# Patient Record
Sex: FEMALE | Race: WHITE | NOT HISPANIC OR LATINO | Employment: FULL TIME | URBAN - METROPOLITAN AREA
[De-identification: names, ages, dates, MRNs, and addresses within clinical notes are randomized per-mention and may not be internally consistent; named-entity substitution may affect disease eponyms.]

---

## 2020-12-14 PROCEDURE — U0003 INFECTIOUS AGENT DETECTION BY NUCLEIC ACID (DNA OR RNA); SEVERE ACUTE RESPIRATORY SYNDROME CORONAVIRUS 2 (SARS-COV-2) (CORONAVIRUS DISEASE [COVID-19]), AMPLIFIED PROBE TECHNIQUE, MAKING USE OF HIGH THROUGHPUT TECHNOLOGIES AS DESCRIBED BY CMS-2020-01-R: HCPCS | Performed by: NURSE PRACTITIONER

## 2021-07-11 ENCOUNTER — HOSPITAL ENCOUNTER (EMERGENCY)
Facility: HOSPITAL | Age: 61
Discharge: HOME | End: 2021-07-11

## 2021-07-11 ENCOUNTER — APPOINTMENT (OUTPATIENT)
Dept: GENERAL RADIOLOGY | Facility: HOSPITAL | Age: 61
End: 2021-07-11

## 2021-07-11 VITALS
BODY MASS INDEX: 28.09 KG/M2 | HEART RATE: 88 BPM | SYSTOLIC BLOOD PRESSURE: 142 MMHG | RESPIRATION RATE: 16 BRPM | OXYGEN SATURATION: 96 % | TEMPERATURE: 98.2 F | HEIGHT: 60 IN | WEIGHT: 143.08 LBS | DIASTOLIC BLOOD PRESSURE: 78 MMHG

## 2021-07-11 DIAGNOSIS — S99.191A CLOSED FRACTURE OF BASE OF FIFTH METATARSAL BONE OF RIGHT FOOT AT METAPHYSEAL-DIAPHYSEAL JUNCTION, INITIAL ENCOUNTER: ICD-10-CM
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
## 2021-07-11 PROCEDURE — 73630 X-RAY EXAM OF FOOT: CPT

## 2021-07-11 PROCEDURE — 99283 EMERGENCY DEPT VISIT LOW MDM: CPT

## 2021-07-11 RX ORDER — HYDROCODONE BITARTRATE AND ACETAMINOPHEN 5; 325 MG/1; MG/1
1 TABLET ORAL EVERY 6 HOURS PRN
Qty: 12 TABLET | Refills: 0 | Status: SHIPPED | OUTPATIENT
Start: 2021-07-11 | End: 2022-04-13

## 2021-07-11 NOTE — ED PROVIDER NOTES
Subjective   Chief complaint: Fall      Context: Patient is a 61-year-old female who comes in complaining of right foot pain after she was at work and fell on a stepladder around 11 AM.  She denies any knee or hip pain.  She denies any prior injuries or surgeries to that foot.  She denies any ankle pain.    Duration:  Shortly prior to arrival  Timing: Waxes and wanes    Severity: Moderate    Associated symptoms: Worse with weightbearing          PCP:            Review of Systems   HENT: Negative.    Respiratory: Negative.    Cardiovascular: Negative.    Gastrointestinal: Negative.    Genitourinary: Negative.    Musculoskeletal: Positive for arthralgias.   Skin: Negative.    Allergic/Immunologic: Negative for immunocompromised state.   Neurological: Negative.    Hematological: Does not bruise/bleed easily.   Psychiatric/Behavioral: Negative for confusion.       Past Medical History:   Diagnosis Date   • COPD (chronic obstructive pulmonary disease) (CMS/[facility])        Allergies   Allergen Reactions   • Azithromycin Anaphylaxis   • Naproxen Anaphylaxis   • Penicillins Anaphylaxis and Swelling       Past Surgical History:   Procedure Laterality Date   • CHOLECYSTECTOMY     • TUBAL ABDOMINAL LIGATION         History reviewed. No pertinent family history.    Social History     Socioeconomic History   • Marital status:      Spouse name: Not on file   • Number of children: Not on file   • Years of education: Not on file   • Highest education level: Not on file   Tobacco Use   • Smoking status: Current Every Day Smoker     Packs/day: 1.00     Types: Cigarettes   • Smokeless tobacco: Never Used   Substance and Sexual Activity   • Alcohol use: Never           Objective   Physical Exam     Vital signs and traige nurse note reviewed.  Constitutional:  Awake, alert; well developed and well nourished.  No acute distress, the patient is examined in hospital gown.  HEENT:  Normocephalic, atraumatic;  with intact EOM; oropharynx  is pink and moist without edema or erythema.  Neck: Supple, full range of motion without pain;   Cardiovascular: Regular rate and rhythm,    Pulmonary: Respiratory effort regular, nonlabored;   Musculoskeletal: No pain over the bilateral malleoli tib-fib knee femur or hip.  There is some swelling and ecchymosis noted to the dorsal aspect of the right mid and proximal foot.  +3 DP pulse.  Distal resting sensorimotor intact  Neuro: Alert oriented x3, speech is clear and appropriate.      Procedures           ED Course        .edlabs  Medications - No data to display  XR Foot 3+ View Right    Result Date: 7/11/2021  Nondisplaced fracture of the proximal diaphysis of the fifth metatarsal.  Electronically Signed By-Fatemeh Morley MD On:7/11/2021 1:50 PM This report was finalized on [id number] by  Fatemeh Morley MD.    Prior to Admission medications    Medication Sig Start Date End Date Taking? Authorizing Provider   albuterol (2.5 MG/3ML) 0.083% nebulizer solution 3 mL, albuterol (5 MG/ML) 0.5% nebulizer solution 0.5 mL Inhale.    Emergency, Nurse Epic, RN   Albuterol Sulfate (PROAIR HFA IN) Inhale.    Emergency, Nurse Epic, RN   Fluticasone-Umeclidin-Vilant (Trelegy Ellipta) 200-62.5-25 MCG/INH aerosol powder  Inhale 1 puff.    Emergency, Nurse Tucker, RN   HYDROcodone-acetaminophen (NORCO) 5-325 MG per tablet Take 1 tablet by mouth Every 6 (Six) Hours As Needed for Moderate Pain . 7/11/21   Susan Bautista APRN   levoFLOXacin (Levaquin) 750 MG tablet Take 1 tablet by mouth Daily. 12/14/20 7/11/21  Sanjuanita Reyes APRN   methylPREDNISolone (MEDROL) 4 MG dose pack Take as directed on package instructions. 12/14/20 7/11/21  Sanjuanita Reyes, MINOO                                       MDM  Number of Diagnoses or Management Options  Closed fracture of base of fifth metatarsal bone of right foot at metaphyseal-diaphyseal junction, initial encounter  Fall, initial encounter  Diagnosis management comments:        Comorbidities:  has a past medical history of COPD (chronic obstructive pulmonary disease) (CMS/[facility]).  Differentials:   not all inclusive of differentials considered fracture strain contusion  Radiology interpretation:  X-rays reviewed by me and interpreted by radiologist,   XR Foot 3+ View Right    Result Date: 7/11/2021  Nondisplaced fracture of the proximal diaphysis of the fifth metatarsal.  Electronically Signed By-Fatemeh Morley MD On:7/11/2021 1:50 PM This report was finalized on [id number] by  Fatemeh Morley MD.      Appropriate PPE worn during exam.  X-ray obtained.  Placed in a postop shoe and given a cane.  Inspect report reviewed.  Will be discharged home with hydrocodone    i discussed findings with patient who voices understanding of discharge instructions, signs and symptoms requiring return to ED; discharged improved and in stable condition with follow up for re-evaluation.        Final diagnoses:   Fall, initial encounter   Closed fracture of base of fifth metatarsal bone of right foot at metaphyseal-diaphyseal junction, initial encounter       ED Disposition  ED Disposition     ED Disposition Condition Comment    Discharge Stable           occupational medicine  See attached form  Schedule an appointment as soon as possible for a visit       Panda Romero MD  [address] [place] [address] [place] [facility]  [phone]    Schedule an appointment as soon as possible for a visit            Medication List      New Prescriptions    HYDROcodone-acetaminophen 5-325 MG per tablet  Commonly known as: NORCO  Take 1 tablet by mouth Every 6 (Six) Hours As Needed for Moderate Pain .        Stop    levoFLOXacin 750 MG tablet  Commonly known as: Levaquin     methylPREDNISolone 4 MG dose pack  Commonly known as: MEDROL           Where to Get Your Medications      These medications were sent to [id number] - [place], [place] - [address]  -  [phone] [facility] [phone] FX  [address] [place] [address]    Phone: [phone]   · HYDROcodone-acetaminophen 5-325 MG per tablet          Susan Bautista, APRN  07/11/21 5910

## 2021-07-11 NOTE — DISCHARGE INSTRUCTIONS
Elevate.  Use ice 20 minutes at a time several times a day.  Follow-up with occupational medicine and you will need orthopedic evaluation.  Return for any new or worsening symptoms

## 2021-11-22 ENCOUNTER — HOSPITAL ENCOUNTER (EMERGENCY)
Facility: HOSPITAL | Age: 61
Discharge: HOME | End: 2021-11-22
Admitting: EMERGENCY MEDICINE

## 2021-11-22 ENCOUNTER — APPOINTMENT (OUTPATIENT)
Dept: CT IMAGING | Facility: HOSPITAL | Age: 61
End: 2021-11-22

## 2021-11-22 VITALS
HEART RATE: 96 BPM | RESPIRATION RATE: 20 BRPM | SYSTOLIC BLOOD PRESSURE: 129 MMHG | HEIGHT: 59 IN | WEIGHT: 140.21 LBS | TEMPERATURE: 97.1 F | BODY MASS INDEX: 28.27 KG/M2 | OXYGEN SATURATION: 93 % | DIASTOLIC BLOOD PRESSURE: 77 MMHG

## 2021-11-22 DIAGNOSIS — J44.1 COPD EXACERBATION (HCC): ICD-10-CM

## 2021-11-22 DIAGNOSIS — B34.9 VIRAL ILLNESS: Primary | ICD-10-CM

## 2021-11-22 DIAGNOSIS — R07.89 CHEST PRESSURE: ICD-10-CM

## 2021-11-22 DIAGNOSIS — J20.6 ACUTE BRONCHITIS DUE TO RHINOVIRUS: ICD-10-CM

## 2021-11-22 LAB
ALBUMIN SERPL-MCNC: 4 G/DL (ref 3.5–5.2)
ALBUMIN/GLOB SERPL: 1.3 G/DL
ALP SERPL-CCNC: 153 U/L (ref 39–117)
ALT SERPL W P-5'-P-CCNC: 11 U/L (ref 1–33)
ANION GAP SERPL CALCULATED.3IONS-SCNC: 12 MMOL/L (ref 5–15)
AST SERPL-CCNC: 17 U/L (ref 1–32)
B PARAPERT DNA SPEC QL NAA+PROBE: NOT DETECTED
B PERT DNA SPEC QL NAA+PROBE: NOT DETECTED
BASOPHILS # BLD AUTO: 0.1 10*3/MM3 (ref 0–0.2)
BASOPHILS NFR BLD AUTO: 1.4 % (ref 0–1.5)
BILIRUB SERPL-MCNC: 0.2 MG/DL (ref 0–1.2)
BUN SERPL-MCNC: 16 MG/DL (ref 8–23)
BUN/CREAT SERPL: 30.8 (ref 7–25)
C PNEUM DNA NPH QL NAA+NON-PROBE: NOT DETECTED
CALCIUM SPEC-SCNC: 9 MG/DL (ref 8.6–10.5)
CHLORIDE SERPL-SCNC: 99 MMOL/L (ref 98–107)
CO2 SERPL-SCNC: 26 MMOL/L (ref 22–29)
CREAT SERPL-MCNC: 0.52 MG/DL (ref 0.57–1)
D-LACTATE SERPL-SCNC: 0.6 MMOL/L (ref 0.5–2)
DEPRECATED RDW RBC AUTO: 41.6 FL (ref 37–54)
EOSINOPHIL # BLD AUTO: 0.2 10*3/MM3 (ref 0–0.4)
EOSINOPHIL NFR BLD AUTO: 2.4 % (ref 0.3–6.2)
ERYTHROCYTE [DISTWIDTH] IN BLOOD BY AUTOMATED COUNT: 14.2 % (ref 12.3–15.4)
FLUAV SUBTYP SPEC NAA+PROBE: NOT DETECTED
FLUBV RNA ISLT QL NAA+PROBE: NOT DETECTED
GFR SERPL CREATININE-BSD FRML MDRD: 119 ML/MIN/1.73
GLOBULIN UR ELPH-MCNC: 3.1 GM/DL
GLUCOSE SERPL-MCNC: 89 MG/DL (ref 65–99)
HADV DNA SPEC NAA+PROBE: NOT DETECTED
HCOV 229E RNA SPEC QL NAA+PROBE: DETECTED
HCOV HKU1 RNA SPEC QL NAA+PROBE: NOT DETECTED
HCOV NL63 RNA SPEC QL NAA+PROBE: NOT DETECTED
HCOV OC43 RNA SPEC QL NAA+PROBE: NOT DETECTED
HCT VFR BLD AUTO: 38.9 % (ref 34–46.6)
HGB BLD-MCNC: 13 G/DL (ref 12–15.9)
HMPV RNA NPH QL NAA+NON-PROBE: NOT DETECTED
HOLD SPECIMEN: NORMAL
HPIV1 RNA ISLT QL NAA+PROBE: NOT DETECTED
HPIV2 RNA SPEC QL NAA+PROBE: NOT DETECTED
HPIV3 RNA NPH QL NAA+PROBE: NOT DETECTED
HPIV4 P GENE NPH QL NAA+PROBE: NOT DETECTED
LYMPHOCYTES # BLD AUTO: 1.4 10*3/MM3 (ref 0.7–3.1)
LYMPHOCYTES NFR BLD AUTO: 21.2 % (ref 19.6–45.3)
M PNEUMO IGG SER IA-ACNC: NOT DETECTED
MCH RBC QN AUTO: 28 PG (ref 26.6–33)
MCHC RBC AUTO-ENTMCNC: 33.6 G/DL (ref 31.5–35.7)
MCV RBC AUTO: 83.6 FL (ref 79–97)
MONOCYTES # BLD AUTO: 0.5 10*3/MM3 (ref 0.1–0.9)
MONOCYTES NFR BLD AUTO: 7.8 % (ref 5–12)
NEUTROPHILS NFR BLD AUTO: 4.6 10*3/MM3 (ref 1.7–7)
NEUTROPHILS NFR BLD AUTO: 67.2 % (ref 42.7–76)
NRBC BLD AUTO-RTO: 0.1 /100 WBC (ref 0–0.2)
PLATELET # BLD AUTO: 415 10*3/MM3 (ref 140–450)
PMV BLD AUTO: 7.2 FL (ref 6–12)
POTASSIUM SERPL-SCNC: 4.5 MMOL/L (ref 3.5–5.2)
PROCALCITONIN SERPL-MCNC: 0.06 NG/ML (ref 0–0.25)
PROT SERPL-MCNC: 7.1 G/DL (ref 6–8.5)
QT INTERVAL: 371 MS
RBC # BLD AUTO: 4.65 10*6/MM3 (ref 3.77–5.28)
RHINOVIRUS RNA SPEC NAA+PROBE: DETECTED
RSV RNA NPH QL NAA+NON-PROBE: NOT DETECTED
SARS-COV-2 RNA NPH QL NAA+NON-PROBE: NOT DETECTED
SODIUM SERPL-SCNC: 137 MMOL/L (ref 136–145)
TROPONIN T SERPL-MCNC: <0.01 NG/ML (ref 0–0.03)
TROPONIN T SERPL-MCNC: <0.01 NG/ML (ref 0–0.03)
WBC NRBC COR # BLD: 6.8 10*3/MM3 (ref 3.4–10.8)
WHOLE BLOOD HOLD SPECIMEN: NORMAL

## 2021-11-22 PROCEDURE — 85025 COMPLETE CBC W/AUTO DIFF WBC: CPT | Performed by: PHYSICIAN ASSISTANT

## 2021-11-22 PROCEDURE — 71275 CT ANGIOGRAPHY CHEST: CPT

## 2021-11-22 PROCEDURE — 0 IOPAMIDOL PER 1 ML: Performed by: PHYSICIAN ASSISTANT

## 2021-11-22 PROCEDURE — 25010000002 METHYLPREDNISOLONE PER 125 MG: Performed by: PHYSICIAN ASSISTANT

## 2021-11-22 PROCEDURE — 83605 ASSAY OF LACTIC ACID: CPT

## 2021-11-22 PROCEDURE — 87040 BLOOD CULTURE FOR BACTERIA: CPT | Performed by: PHYSICIAN ASSISTANT

## 2021-11-22 PROCEDURE — 94640 AIRWAY INHALATION TREATMENT: CPT

## 2021-11-22 PROCEDURE — 96374 THER/PROPH/DIAG INJ IV PUSH: CPT

## 2021-11-22 PROCEDURE — 0202U NFCT DS 22 TRGT SARS-COV-2: CPT | Performed by: PHYSICIAN ASSISTANT

## 2021-11-22 PROCEDURE — 99284 EMERGENCY DEPT VISIT MOD MDM: CPT

## 2021-11-22 PROCEDURE — 80053 COMPREHEN METABOLIC PANEL: CPT | Performed by: PHYSICIAN ASSISTANT

## 2021-11-22 PROCEDURE — 93005 ELECTROCARDIOGRAM TRACING: CPT | Performed by: PHYSICIAN ASSISTANT

## 2021-11-22 PROCEDURE — 84145 PROCALCITONIN (PCT): CPT | Performed by: PHYSICIAN ASSISTANT

## 2021-11-22 PROCEDURE — 84484 ASSAY OF TROPONIN QUANT: CPT | Performed by: PHYSICIAN ASSISTANT

## 2021-11-22 PROCEDURE — 94799 UNLISTED PULMONARY SVC/PX: CPT

## 2021-11-22 RX ORDER — METHYLPREDNISOLONE 4 MG/1
TABLET ORAL
Qty: 21 EACH | Refills: 0 | Status: SHIPPED | OUTPATIENT
Start: 2021-11-22 | End: 2022-04-13

## 2021-11-22 RX ORDER — METHYLPREDNISOLONE SODIUM SUCCINATE 125 MG/2ML
125 INJECTION, POWDER, LYOPHILIZED, FOR SOLUTION INTRAMUSCULAR; INTRAVENOUS ONCE
Status: COMPLETED | OUTPATIENT
Start: 2021-11-22 | End: 2021-11-22

## 2021-11-22 RX ORDER — SODIUM CHLORIDE 0.9 % (FLUSH) 0.9 %
10 SYRINGE (ML) INJECTION AS NEEDED
Status: DISCONTINUED | OUTPATIENT
Start: 2021-11-22 | End: 2021-11-22 | Stop reason: HOSPADM

## 2021-11-22 RX ORDER — ALBUTEROL SULFATE 90 UG/1
2 AEROSOL, METERED RESPIRATORY (INHALATION) ONCE
Status: COMPLETED | OUTPATIENT
Start: 2021-11-22 | End: 2021-11-22

## 2021-11-22 RX ORDER — ALBUTEROL SULFATE 1.25 MG/3ML
1 SOLUTION RESPIRATORY (INHALATION) EVERY 6 HOURS PRN
Qty: 3 ML | Refills: 0 | OUTPATIENT
Start: 2021-11-22 | End: 2022-11-14

## 2021-11-22 RX ADMIN — IOPAMIDOL 100 ML: 755 INJECTION, SOLUTION INTRAVENOUS at 16:40

## 2021-11-22 RX ADMIN — ALBUTEROL SULFATE 2 PUFF: 108 INHALANT RESPIRATORY (INHALATION) at 15:50

## 2021-11-22 RX ADMIN — METHYLPREDNISOLONE SODIUM SUCCINATE 125 MG: 125 INJECTION, POWDER, FOR SOLUTION INTRAMUSCULAR; INTRAVENOUS at 15:14

## 2021-11-22 NOTE — DISCHARGE INSTRUCTIONS
Please use Tylenol as needed for fever and pain control.  Please take Medrol Dosepak as prescribed even if feeling better to completion.  Please follow-up with your primary care provider in 1 week's time.  If you do not have a primary care provider please call the patient connect number above to set this up.  Can use over-the-counter cough suppressants and Mucinex as needed for further symptom management to help with congestion.  Please take albuterol as needed for shortness of breath and wheezing as needed.  Please come back to the ER if you have severe chest pain or shortness of breath as you will need reevaluation at that time.

## 2021-11-22 NOTE — ED NOTES
Ambulated pt around room. Steady on feet, no dizziness. O2 saturation stayed at 91% - 92%     Sanjuanita Castillo RN  11/22/21 9083

## 2021-11-22 NOTE — ED NOTES
"Pt c/o cough as well. Pt smokes, no oxygen at home. Has COPD. States recovering from the flu. Pt states home covid test was negative. Coughs up yellow sputum. Daughter states \"streaks of blood\" in sputum. States she gets pneumonia approx 2 year.      Sanjuanita Castillo RN  11/22/21 9259    "

## 2021-11-22 NOTE — ED PROVIDER NOTES
Subjective     Patient is a 61-year-old female comes in complaining of cough and congestion for the past 9 days.  Patient states that she took an antibiotic and when she is unsure of the name in which she had leftover from a prior pneumonia and states that her symptoms improved for a few days but got worse over the past 2 days.  Patient states she has a history of COPD and takes nebulizers and inhalers at home that have somewhat helped her symptoms.  Patient states that she had some chest pressure in the middle of her chest that was nonradiating that was mild about a 2 out of 10 but resolved on its own and denies any chest pain or pressure at this time.  Patient states that she has a productive cough with yellow sputum and about 4 days ago had streaks of blood in the sputum but states that this has improved.  Patient reports subjective fever and chills that come and go throughout her length of symptoms.  Patient states she is not on supplemental oxygen at home.  Patient states she had a home Covid test that was 9 days ago but did not get tested anywhere else and is not vaccinated for COVID-19.  Patient denies any prior history of blood clots or any heart issues in the past.        Review of Systems   Constitutional: Positive for chills. Negative for diaphoresis, fatigue and fever.   HENT: Positive for congestion, rhinorrhea and sinus pressure. Negative for nosebleeds, postnasal drip, sinus pain, sneezing, sore throat, tinnitus and trouble swallowing.    Eyes: Negative for photophobia, discharge and visual disturbance.   Respiratory: Positive for cough, chest tightness and shortness of breath. Negative for wheezing.    Cardiovascular: Positive for chest pain (pressure, resolved). Negative for palpitations and leg swelling.   Gastrointestinal: Negative for abdominal pain, blood in stool, diarrhea, nausea and vomiting.   Genitourinary: Negative for dysuria, flank pain, frequency and urgency.   Musculoskeletal: Negative  for arthralgias and myalgias.   Skin: Negative for rash.   Neurological: Negative for dizziness, syncope, light-headedness and headaches.   Psychiatric/Behavioral: Negative for confusion.       Past Medical History:   Diagnosis Date   • COPD (chronic obstructive pulmonary disease) (HCC)        Allergies   Allergen Reactions   • Azithromycin Anaphylaxis   • Naproxen Anaphylaxis   • Penicillins Anaphylaxis and Swelling       Past Surgical History:   Procedure Laterality Date   • CHOLECYSTECTOMY     • TUBAL ABDOMINAL LIGATION         History reviewed. No pertinent family history.    Social History     Socioeconomic History   • Marital status:    Tobacco Use   • Smoking status: Current Every Day Smoker     Packs/day: 1.00     Types: Cigarettes   • Smokeless tobacco: Never Used   Substance and Sexual Activity   • Alcohol use: Never           Objective   Physical Exam  Vitals and nursing note reviewed.   Constitutional:       General: She is not in acute distress.     Appearance: She is well-developed. She is not diaphoretic.   HENT:      Head: Normocephalic and atraumatic.      Nose: Nose normal.      Mouth/Throat:      Pharynx: No oropharyngeal exudate.   Eyes:      Extraocular Movements: Extraocular movements intact.      Conjunctiva/sclera: Conjunctivae normal.      Pupils: Pupils are equal, round, and reactive to light.   Cardiovascular:      Rate and Rhythm: Normal rate and regular rhythm.      Heart sounds: Normal heart sounds.      Comments: S1, S2 audible.  Pulmonary:      Effort: Pulmonary effort is normal. No respiratory distress.      Breath sounds: Normal breath sounds. No wheezing.      Comments: On room air.  Abdominal:      General: Bowel sounds are normal. There is no distension.      Palpations: Abdomen is soft.      Tenderness: There is no abdominal tenderness.   Musculoskeletal:         General: No tenderness or deformity. Normal range of motion.      Cervical back: Normal range of motion.  "  Skin:     General: Skin is warm.      Capillary Refill: Capillary refill takes less than 2 seconds.      Findings: No erythema or rash.   Neurological:      Mental Status: She is alert and oriented to person, place, and time.      Cranial Nerves: No cranial nerve deficit.   Psychiatric:         Mood and Affect: Mood normal.         Behavior: Behavior normal.         Procedures           ED Course      /67   Pulse 93   Temp 97.1 °F (36.2 °C) (Temporal)   Resp 16   Ht 149.9 cm (59\")   Wt 63.6 kg (140 lb 3.4 oz)   SpO2 90%   BMI 28.32 kg/m²   Labs Reviewed   RESPIRATORY PANEL PCR W/ COVID-19 (SARS-COV-2) NICK/ANISH/SYLVIA/PAD/COR/MAD/MIGUEL IN-HOUSE, NP SWAB IN UTM/VTP, 3-4 HR TAT - Abnormal; Notable for the following components:       Result Value    Coronavirus 229E Detected (*)     Human Rhinovirus/Enterovirus Detected (*)     All other components within normal limits    Narrative:     In the setting of a positive respiratory panel with a viral infection PLUS a negative procalcitonin without other underlying concern for bacterial infection, consider observing off antibiotics or discontinuation of antibiotics and continue supportive care. If the respiratory panel is positive for atypical bacterial infection (Bordetella pertussis, Chlamydophila pneumoniae, or Mycoplasma pneumoniae), consider antibiotic de-escalation to target atypical bacterial infection.   COMPREHENSIVE METABOLIC PANEL - Abnormal; Notable for the following components:    Creatinine 0.52 (*)     Alkaline Phosphatase 153 (*)     BUN/Creatinine Ratio 30.8 (*)     All other components within normal limits    Narrative:     GFR Normal >60  Chronic Kidney Disease <60  Kidney Failure <15     TROPONIN (IN-HOUSE) - Normal    Narrative:     Troponin T Reference Range:  <= 0.03 ng/mL-   Negative for AMI  >0.03 ng/mL-     Abnormal for myocardial necrosis.  Clinicians would have to utilize clinical acumen, EKG, Troponin and serial changes to determine if it " "is an Acute Myocardial Infarction or myocardial injury due to an underlying chronic condition.       Results may be falsely decreased if patient taking Biotin.     CBC WITH AUTO DIFFERENTIAL - Normal   PROCALCITONIN - Normal    Narrative:     As a Marker for Sepsis (Non-Neonates):     1. <0.5 ng/mL represents a low risk of severe sepsis and/or septic shock.  2. >2 ng/mL represents a high risk of severe sepsis and/or septic shock.    As a Marker for Lower Respiratory Tract Infections that require antibiotic therapy:  PCT on Admission     Antibiotic Therapy             6-12 Hrs later  >0.5                          Strongly Recommended            >0.25 - <0.5             Recommended  0.1 - 0.25                  Discouraged                       Remeasure/reassess PCT  <0.1                         Strongly Discouraged         Remeasure/reassess PCT      As 28 day mortality risk marker: \"Change in Procalcitonin Result\" (>80% or <=80%) if Day 0 (or Day 1) and Day 4 values are available. Refer to http://[email or web address] [email or web address]-[email or web address]/    Change in PCT <=80 %   A decrease of PCT levels below or equal to 80% defines a positive change in PCT test result representing a higher risk for 28-day all-cause mortality of patients diagnosed with severe sepsis or septic shock.    Change in PCT >80 %   A decrease of PCT levels of more than 80% defines a negative change in PCT result representing a lower risk for 28-day all-cause mortality of patients diagnosed with severe sepsis or septic shock.               TROPONIN (IN-HOUSE) - Normal    Narrative:     Troponin T Reference Range:  <= 0.03 ng/mL-   Negative for AMI  >0.03 ng/mL-     Abnormal for myocardial necrosis.  Clinicians would have to utilize clinical acumen, EKG, Troponin and serial changes to determine if it is an Acute Myocardial Infarction or myocardial injury due to an underlying chronic condition.       Results may be falsely decreased if patient taking Biotin.     POC " LACTATE - Normal   BLOOD CULTURE   BLOOD CULTURE   POC LACTATE   CBC AND DIFFERENTIAL    Narrative:     The following orders were created for panel order CBC & Differential.  Procedure                               Abnormality         Status                     ---------                               -----------         ------                     CBC Auto Differential[[id number]]        Normal              Final result                 Please view results for these tests on the individual orders.   EXTRA TUBES    Narrative:     The following orders were created for panel order Extra Tubes.  Procedure                               Abnormality         Status                     ---------                               -----------         ------                     Gold Top - SST[[id number]]                                   Final result               Light Blue Top[[id number]]                                   Final result                 Please view results for these tests on the individual orders.   GOLD TOP - SST   LIGHT BLUE TOP     CT Chest Pulmonary Embolism    Result Date: 11/22/2021  1.No evidence for pulmonary embolism. 2.Severe changes of emphysema are noted. 3.There are patchy opacities throughout the perihilar aspect of the right lung. The findings suggest developing multifocal right lung pneumonia. Changes of underlying malignancy are felt less likely. Recommend correlation for signs or symptoms of acute infection. Also recommend follow-up to ensure complete resolution.  Electronically Signed By-Leonel Khan MD On:11/22/2021 4:59 PM This report was finalized on [id number] by  Leonel Khan MD.                                         MDM  Number of Diagnoses or Management Options     Amount and/or Complexity of Data Reviewed  Clinical lab tests: reviewed  Tests in the radiology section of CPT®: reviewed  Tests in the medicine section of CPT®: reviewed    Risk of Complications, Morbidity, and/or  "Mortality  Presenting problems: low  Diagnostic procedures: low  Management options: low    Patient Progress  Patient progress: stable       Chart review: Heart score of 3.    EKG: EKG reviewed by myself and interpreted by Dr. Gerald Naidu shows sinus rhythm 88 bpm, no ST elevation apparent, no previous to compare.    Imaging:    CT Chest Pulmonary Embolism   Final Result   1.No evidence for pulmonary embolism.   2.Severe changes of emphysema are noted.   3.There are patchy opacities throughout the perihilar aspect of the   right lung. The findings suggest developing multifocal right lung   pneumonia. Changes of underlying malignancy are felt less likely.   Recommend correlation for signs or symptoms of acute infection. Also   recommend follow-up to ensure complete resolution.       Electronically Signed By-Leonel Khan MD On:11/22/2021 4:59 PM   This report was finalized on [id number] by  Leonel Khan MD.          Labs: Patient positive for coronavirus but negative for COVID-19.  Patient was positive for human rhinovirus as well.  Blood cultures x2 pending.  Initial lactic normal.  Initial troponin negative, procalcitonin normal, CBC and CMP largely unremarkable for acute findings.    Vitals:  /67   Pulse 93   Temp 97.1 °F (36.2 °C) (Temporal)   Resp 16   Ht 149.9 cm (59\")   Wt 63.6 kg (140 lb 3.4 oz)   SpO2 90%   BMI 28.32 kg/m²     Medications given:    Medications   sodium chloride 0.9 % flush 10 mL (has no administration in time range)   albuterol sulfate HFA (PROVENTIL HFA;VENTOLIN HFA;PROAIR HFA) inhaler 2 puff (2 puffs Inhalation Given 11/22/21 1550)   methylPREDNISolone sodium succinate (SOLU-Medrol) injection 125 mg (125 mg Intravenous Given 11/22/21 1514)   iopamidol (ISOVUE-370) 76 % injection 100 mL (100 mL Intravenous Given 11/22/21 1640)       Procedures:    MDM: Patient is a 61-year-old female comes in complaining of cough and congestion and has a history of COPD.  Patient was not " hypoxic and heart rate was initially 105 and had Covid-like symptoms.  Patient had 90% SPO2 are above and likely hangs out low due to her history of COPD.  Patient not requiring supplemental oxygen at this time and is not on supplemental oxygen at home.  Patient was nonwheezy on exam but given patient's symptoms patient was given Solu-Medrol as well as albuterol treatment and patient reported some improvement of symptoms.  Initial lactic normal blood cultures x2 pending.  CBC and CMP largely unremarkable.  Respiratory viral panel was positive for coronavirus but negative for COVID-19.  This was also positive for human rhinovirus as well.  CT PE protocol was obtained due to patient's tachycardia and shortness of breath as well as chest pressure however this was negative for pulmonary embolism.  Patient has findings consistent with coronavirus infection.  Patient does not have any indication for further antibiotics at this time.  Patient will be discharged on Medrol Dosepak as well as a refill of her inhalers.  Patient is given strict return precautions and voiced understanding.  Patient was also offered observation admission for further cardiac work-up by patient is refusing this at this time and states that she feels like her symptoms are consistent with COPD exacerbation that she has had in the past. See full discharge instructions for further details.  Results and plan discussed with patient and is agreeable with plan.    Final diagnoses:   Viral illness   Acute bronchitis due to Rhinovirus   Chest pressure   COPD exacerbation (HCC)       ED Disposition  ED Disposition     ED Disposition Condition Comment    Discharge Stable           [facility] EMERGENCY DEPARTMENT  [address] [address] [phone]  Go in 1 day  As needed, If symptoms worsen    PATIENT [facility] [address]  [phone]  Call in 1 day           Medication List      New Prescriptions     methylPREDNISolone 4 MG dose pack  Commonly known as: MEDROL  Take as directed on package instructions.        Changed    * albuterol (2.5 MG/3ML) 0.083% nebulizer solution 3 mL, albuterol (5 MG/ML) 0.5% nebulizer solution 0.5 mL  What changed: Another medication with the same name was added. Make sure you understand how and when to take each.     * PROAIR HFA IN  What changed: Another medication with the same name was added. Make sure you understand how and when to take each.     * albuterol 1.25 MG/3ML nebulizer solution  Commonly known as: ACCUNEB  Take 3 mL by nebulization Every 6 (Six) Hours As Needed for Wheezing or Shortness of Air.  What changed: You were already taking a medication with the same name, and this prescription was added. Make sure you understand how and when to take each.     Trelegy Ellipta 200-62.5-25 MCG/INH inhaler  Generic drug: Fluticasone-Umeclidin-Vilant  Inhale 1 puff Daily.  What changed: when to take this         * This list has 3 medication(s) that are the same as other medications prescribed for you. Read the directions carefully, and ask your doctor or other care provider to review them with you.               Where to Get Your Medications      These medications were sent to [id number] - [place] - [address] [facility] [address] - [phone] PH - [phone] FX  [address] [place] [address]    Phone: [phone]   · albuterol 1.25 MG/3ML nebulizer solution  · methylPREDNISolone 4 MG dose pack  · Trelegy Ellipta 200-62.5-25 MCG/INH inhaler          Sebastian Salcedo PA  11/22/21 4110

## 2021-11-27 LAB
BACTERIA SPEC AEROBE CULT: NORMAL
BACTERIA SPEC AEROBE CULT: NORMAL

## 2022-10-04 ENCOUNTER — HOSPITAL ENCOUNTER (EMERGENCY)
Facility: HOSPITAL | Age: 62
Discharge: HOME | End: 2022-10-04
Attending: EMERGENCY MEDICINE | Admitting: EMERGENCY MEDICINE

## 2022-10-04 VITALS
TEMPERATURE: 98.8 F | DIASTOLIC BLOOD PRESSURE: 67 MMHG | SYSTOLIC BLOOD PRESSURE: 127 MMHG | BODY MASS INDEX: 27.66 KG/M2 | HEIGHT: 60 IN | OXYGEN SATURATION: 98 % | RESPIRATION RATE: 18 BRPM | WEIGHT: 140.87 LBS | HEART RATE: 97 BPM

## 2022-10-04 DIAGNOSIS — S61.412A LACERATION OF LEFT PALM, INITIAL ENCOUNTER: Primary | ICD-10-CM

## 2022-10-04 PROCEDURE — 99283 EMERGENCY DEPT VISIT LOW MDM: CPT

## 2022-10-04 PROCEDURE — 90715 TDAP VACCINE 7 YRS/> IM: CPT | Performed by: EMERGENCY MEDICINE

## 2022-10-04 PROCEDURE — 25010000002 TETANUS-DIPHTH-ACELL PERTUSSIS 5-2.5-18.5 LF-MCG/0.5 SUSPENSION PREFILLED SYRINGE: Performed by: EMERGENCY MEDICINE

## 2022-10-04 PROCEDURE — 90471 IMMUNIZATION ADMIN: CPT | Performed by: EMERGENCY MEDICINE

## 2022-10-04 RX ADMIN — TETANUS TOXOID, REDUCED DIPHTHERIA TOXOID AND ACELLULAR PERTUSSIS VACCINE, ADSORBED 0.5 ML: 5; 2.5; 8; 8; 2.5 SUSPENSION INTRAMUSCULAR at 17:39

## 2022-10-04 NOTE — ED NOTES
Patient was opening a box with a  knife and it slipped catching the palm of her left hand. Laceration noted

## 2022-10-04 NOTE — ED PROVIDER NOTES
Subjective   History of Present Illness  62-year-old female with history of COPD presents for laceration base of left hand.  Happened 2 to 3 hours prior to arrival.  Cut with a .  Last Tdap more than 5 years ago.  Patient cleaned with alcohol prior to arrival.  No numbness.  No weakness.  Bleeding controlled earlier.  Mild pain with movement.  Pain improves when she does not move it.  Has not taken anything for pain.  Review of Systems   Skin: Positive for wound.   Neurological: Negative for weakness and numbness.   All other systems reviewed and are negative.      Past Medical History:   Diagnosis Date   • COPD (chronic obstructive pulmonary disease) (HCC)        Allergies   Allergen Reactions   • Azithromycin Anaphylaxis   • Naproxen Anaphylaxis   • Penicillin G Swelling   • Penicillins Anaphylaxis and Swelling   • Codeine Nausea And Vomiting       Past Surgical History:   Procedure Laterality Date   • CHOLECYSTECTOMY     • TUBAL ABDOMINAL LIGATION         No family history on file.    Social History     Socioeconomic History   • Marital status:    Tobacco Use   • Smoking status: Current Every Day Smoker     Packs/day: 1.00     Types: Cigarettes   • Smokeless tobacco: Never Used   Substance and Sexual Activity   • Alcohol use: Never           Objective   Physical Exam  Constitutional:  No acute distress.  Head:  Atraumatic.  Normocephalic.   Eyes:  No scleral icterus. Normal conjunctiva  ENT:  Moist mucosa.  No nasal discharge present.  Cardiovascular:  Well perfused.  Equal pulses.  Regular rhythm and normal rate.  Normal capillary refill.    Pulmonary/Chest:  No respiratory distress.  Airway patent.  No tachypnea.  No accessory muscle usage.    Extremities: 2+ radial pulse left upper extremity.  Cap refill less than 3 seconds on each finger.  Strength intact in wrist and all fingers of left hand.  3 cm laceration that does not extend into the subcu on palmar surface at base of left hand.   "Sensation intact to light touch in each finger and over palm.  Skin:  Warm, dry  Neurological:  Alert, awake, and appropriate.  Normal speech.      Procedures           ED Course      /67 (BP Location: Left arm, Patient Position: Sitting)   Pulse 97   Temp 98.8 °F (37.1 °C) (Oral)   Resp 18   Ht 151.1 cm (59.5\")   Wt 63.9 kg (140 lb 14 oz)   SpO2 98%   BMI 27.98 kg/m²   Labs Reviewed - No data to display  Medications   Tetanus-Diphth-Acell Pertussis (BOOSTRIX) injection 0.5 mL (has no administration in time range)     No radiology results for the last day                                       MDM  Patient neurovascularly intact.  Tdap updated.  Discussed risks and benefits of closing laceration and patient states that she does not want to close wound at this time.  Will irrigate.  Wound care discussed.  Will DC home  Final diagnoses:   Laceration of left palm, initial encounter       ED Disposition  ED Disposition     ED Disposition   Discharge    Condition   Stable    Comment   --             PATIENT CONNECTION - [facility] [address] [phone]  In 1 week  For wound re-check         Medication List      No changes were made to your prescriptions during this visit.          Jared Garcia MD  10/04/22 1730    "

## 2022-11-14 ENCOUNTER — APPOINTMENT (OUTPATIENT)
Dept: GENERAL RADIOLOGY | Facility: HOSPITAL | Age: 62
End: 2022-11-14

## 2022-11-14 ENCOUNTER — HOSPITAL ENCOUNTER (EMERGENCY)
Facility: HOSPITAL | Age: 62
Discharge: HOME | End: 2022-11-14
Attending: EMERGENCY MEDICINE | Admitting: EMERGENCY MEDICINE

## 2022-11-14 VITALS
BODY MASS INDEX: 28.89 KG/M2 | DIASTOLIC BLOOD PRESSURE: 78 MMHG | HEIGHT: 59 IN | SYSTOLIC BLOOD PRESSURE: 138 MMHG | WEIGHT: 143.3 LBS | TEMPERATURE: 98.4 F | RESPIRATION RATE: 12 BRPM | HEART RATE: 85 BPM | OXYGEN SATURATION: 99 %

## 2022-11-14 DIAGNOSIS — J44.1 COPD WITH ACUTE EXACERBATION: ICD-10-CM

## 2022-11-14 DIAGNOSIS — T78.2XXA ANAPHYLAXIS, INITIAL ENCOUNTER: Primary | ICD-10-CM

## 2022-11-14 LAB
ALBUMIN SERPL-MCNC: 4.3 G/DL (ref 3.5–5.2)
ALBUMIN/GLOB SERPL: 1.5 G/DL
ALP SERPL-CCNC: 131 U/L (ref 39–117)
ALT SERPL W P-5'-P-CCNC: 9 U/L (ref 1–33)
ANION GAP SERPL CALCULATED.3IONS-SCNC: 14 MMOL/L (ref 5–15)
AST SERPL-CCNC: 26 U/L (ref 1–32)
BASOPHILS # BLD AUTO: 0.1 10*3/MM3 (ref 0–0.2)
BASOPHILS NFR BLD AUTO: 0.4 % (ref 0–1.5)
BILIRUB SERPL-MCNC: 0.2 MG/DL (ref 0–1.2)
BUN SERPL-MCNC: 11 MG/DL (ref 8–23)
BUN/CREAT SERPL: 19.6 (ref 7–25)
CALCIUM SPEC-SCNC: 8.9 MG/DL (ref 8.6–10.5)
CHLORIDE SERPL-SCNC: 103 MMOL/L (ref 98–107)
CO2 SERPL-SCNC: 24 MMOL/L (ref 22–29)
CREAT SERPL-MCNC: 0.56 MG/DL (ref 0.57–1)
D-LACTATE SERPL-SCNC: 0.7 MMOL/L (ref 0.5–2)
DEPRECATED RDW RBC AUTO: 43.8 FL (ref 37–54)
EGFRCR SERPLBLD CKD-EPI 2021: 102.7 ML/MIN/1.73
EOSINOPHIL # BLD AUTO: 0.2 10*3/MM3 (ref 0–0.4)
EOSINOPHIL NFR BLD AUTO: 1.8 % (ref 0.3–6.2)
ERYTHROCYTE [DISTWIDTH] IN BLOOD BY AUTOMATED COUNT: 14 % (ref 12.3–15.4)
GLOBULIN UR ELPH-MCNC: 2.9 GM/DL
GLUCOSE SERPL-MCNC: 114 MG/DL (ref 65–99)
HCT VFR BLD AUTO: 40.9 % (ref 34–46.6)
HGB BLD-MCNC: 13.9 G/DL (ref 12–15.9)
HOLD SPECIMEN: NORMAL
LYMPHOCYTES # BLD AUTO: 1.7 10*3/MM3 (ref 0.7–3.1)
LYMPHOCYTES NFR BLD AUTO: 14.7 % (ref 19.6–45.3)
MCH RBC QN AUTO: 28.8 PG (ref 26.6–33)
MCHC RBC AUTO-ENTMCNC: 34 G/DL (ref 31.5–35.7)
MCV RBC AUTO: 84.5 FL (ref 79–97)
MONOCYTES # BLD AUTO: 0.6 10*3/MM3 (ref 0.1–0.9)
MONOCYTES NFR BLD AUTO: 5.1 % (ref 5–12)
NEUTROPHILS NFR BLD AUTO: 78 % (ref 42.7–76)
NEUTROPHILS NFR BLD AUTO: 9 10*3/MM3 (ref 1.7–7)
NRBC BLD AUTO-RTO: 0.2 /100 WBC (ref 0–0.2)
NT-PROBNP SERPL-MCNC: 64.7 PG/ML (ref 0–900)
PLATELET # BLD AUTO: 407 10*3/MM3 (ref 140–450)
PMV BLD AUTO: 7.9 FL (ref 6–12)
POTASSIUM SERPL-SCNC: 4.3 MMOL/L (ref 3.5–5.2)
PROCALCITONIN SERPL-MCNC: 0.03 NG/ML (ref 0–0.25)
PROT SERPL-MCNC: 7.2 G/DL (ref 6–8.5)
RBC # BLD AUTO: 4.84 10*6/MM3 (ref 3.77–5.28)
SODIUM SERPL-SCNC: 141 MMOL/L (ref 136–145)
TROPONIN T SERPL-MCNC: <0.01 NG/ML (ref 0–0.03)
WBC NRBC COR # BLD: 11.5 10*3/MM3 (ref 3.4–10.8)
WHOLE BLOOD HOLD COAG: NORMAL

## 2022-11-14 PROCEDURE — 83880 ASSAY OF NATRIURETIC PEPTIDE: CPT | Performed by: NURSE PRACTITIONER

## 2022-11-14 PROCEDURE — 36415 COLL VENOUS BLD VENIPUNCTURE: CPT

## 2022-11-14 PROCEDURE — 25010000002 DIPHENHYDRAMINE PER 50 MG

## 2022-11-14 PROCEDURE — 84145 PROCALCITONIN (PCT): CPT | Performed by: NURSE PRACTITIONER

## 2022-11-14 PROCEDURE — 96375 TX/PRO/DX INJ NEW DRUG ADDON: CPT

## 2022-11-14 PROCEDURE — 71045 X-RAY EXAM CHEST 1 VIEW: CPT

## 2022-11-14 PROCEDURE — 96372 THER/PROPH/DIAG INJ SC/IM: CPT

## 2022-11-14 PROCEDURE — 80053 COMPREHEN METABOLIC PANEL: CPT | Performed by: NURSE PRACTITIONER

## 2022-11-14 PROCEDURE — 84484 ASSAY OF TROPONIN QUANT: CPT | Performed by: NURSE PRACTITIONER

## 2022-11-14 PROCEDURE — 25010000002 EPINEPHRINE PER 0.1 MG

## 2022-11-14 PROCEDURE — 85025 COMPLETE CBC W/AUTO DIFF WBC: CPT | Performed by: NURSE PRACTITIONER

## 2022-11-14 PROCEDURE — 99284 EMERGENCY DEPT VISIT MOD MDM: CPT

## 2022-11-14 PROCEDURE — 93005 ELECTROCARDIOGRAM TRACING: CPT | Performed by: EMERGENCY MEDICINE

## 2022-11-14 PROCEDURE — 25010000002 EPINEPHRINE PER 0.1 MG: Performed by: NURSE PRACTITIONER

## 2022-11-14 PROCEDURE — 93005 ELECTROCARDIOGRAM TRACING: CPT | Performed by: NURSE PRACTITIONER

## 2022-11-14 PROCEDURE — 25010000002 METHYLPREDNISOLONE PER 125 MG

## 2022-11-14 PROCEDURE — 96374 THER/PROPH/DIAG INJ IV PUSH: CPT

## 2022-11-14 PROCEDURE — 87040 BLOOD CULTURE FOR BACTERIA: CPT | Performed by: NURSE PRACTITIONER

## 2022-11-14 PROCEDURE — 83605 ASSAY OF LACTIC ACID: CPT

## 2022-11-14 RX ORDER — DIPHENHYDRAMINE HYDROCHLORIDE 50 MG/ML
INJECTION INTRAMUSCULAR; INTRAVENOUS
Status: COMPLETED
Start: 2022-11-14 | End: 2022-11-14

## 2022-11-14 RX ORDER — METHYLPREDNISOLONE SODIUM SUCCINATE 125 MG/2ML
125 INJECTION, POWDER, LYOPHILIZED, FOR SOLUTION INTRAMUSCULAR; INTRAVENOUS ONCE
Status: COMPLETED | OUTPATIENT
Start: 2022-11-14 | End: 2022-11-14

## 2022-11-14 RX ORDER — METHYLPREDNISOLONE SODIUM SUCCINATE 125 MG/2ML
INJECTION, POWDER, LYOPHILIZED, FOR SOLUTION INTRAMUSCULAR; INTRAVENOUS
Status: COMPLETED
Start: 2022-11-14 | End: 2022-11-14

## 2022-11-14 RX ORDER — EPINEPHRINE 1 MG/ML
0.3 INJECTION, SOLUTION, CONCENTRATE INTRAVENOUS ONCE
Status: COMPLETED | OUTPATIENT
Start: 2022-11-14 | End: 2022-11-14

## 2022-11-14 RX ORDER — EPINEPHRINE 0.3 MG/.3ML
0.3 INJECTION SUBCUTANEOUS ONCE
Qty: 1 EACH | Refills: 0 | Status: SHIPPED | OUTPATIENT
Start: 2022-11-14 | End: 2022-11-14

## 2022-11-14 RX ORDER — ALBUTEROL SULFATE 90 UG/1
2 AEROSOL, METERED RESPIRATORY (INHALATION) EVERY 4 HOURS PRN
Qty: 1 G | Refills: 0 | Status: SHIPPED | OUTPATIENT
Start: 2022-11-14 | End: 2023-01-04 | Stop reason: SDUPTHER

## 2022-11-14 RX ORDER — IPRATROPIUM BROMIDE AND ALBUTEROL SULFATE 2.5; .5 MG/3ML; MG/3ML
3 SOLUTION RESPIRATORY (INHALATION) EVERY 4 HOURS PRN
Qty: 90 ML | Refills: 0 | Status: ON HOLD | OUTPATIENT
Start: 2022-11-14 | End: 2022-12-31 | Stop reason: SDUPTHER

## 2022-11-14 RX ORDER — EPINEPHRINE 1 MG/ML
INJECTION, SOLUTION, CONCENTRATE INTRAVENOUS
Status: COMPLETED
Start: 2022-11-14 | End: 2022-11-14

## 2022-11-14 RX ORDER — FAMOTIDINE 10 MG/ML
20 INJECTION, SOLUTION INTRAVENOUS ONCE
Status: COMPLETED | OUTPATIENT
Start: 2022-11-14 | End: 2022-11-14

## 2022-11-14 RX ORDER — METHYLPREDNISOLONE 4 MG/1
TABLET ORAL
Qty: 21 TABLET | Refills: 0 | OUTPATIENT
Start: 2022-11-14 | End: 2022-12-23

## 2022-11-14 RX ORDER — DIPHENHYDRAMINE HYDROCHLORIDE 50 MG/ML
25 INJECTION INTRAMUSCULAR; INTRAVENOUS ONCE
Status: COMPLETED | OUTPATIENT
Start: 2022-11-14 | End: 2022-11-14

## 2022-11-14 RX ADMIN — EPINEPHRINE 0.3 MG: 1 INJECTION, SOLUTION, CONCENTRATE INTRAVENOUS at 08:58

## 2022-11-14 RX ADMIN — EPINEPHRINE 0.3 MG: 1 INJECTION, SOLUTION, CONCENTRATE INTRAVENOUS at 08:36

## 2022-11-14 RX ADMIN — METHYLPREDNISOLONE SODIUM SUCCINATE 125 MG: 125 INJECTION, POWDER, LYOPHILIZED, FOR SOLUTION INTRAMUSCULAR; INTRAVENOUS at 08:40

## 2022-11-14 RX ADMIN — DIPHENHYDRAMINE HYDROCHLORIDE 25 MG: 50 INJECTION INTRAMUSCULAR; INTRAVENOUS at 08:41

## 2022-11-14 RX ADMIN — DIPHENHYDRAMINE HYDROCHLORIDE 25 MG: 50 INJECTION, SOLUTION INTRAMUSCULAR; INTRAVENOUS at 08:41

## 2022-11-14 RX ADMIN — SODIUM CHLORIDE, POTASSIUM CHLORIDE, SODIUM LACTATE AND CALCIUM CHLORIDE 1000 ML: 600; 310; 30; 20 INJECTION, SOLUTION INTRAVENOUS at 08:51

## 2022-11-14 RX ADMIN — FAMOTIDINE 20 MG: 10 INJECTION INTRAVENOUS at 08:52

## 2022-11-14 RX ADMIN — METHYLPREDNISOLONE SODIUM SUCCINATE 125 MG: 125 INJECTION, POWDER, FOR SOLUTION INTRAMUSCULAR; INTRAVENOUS at 08:40

## 2022-11-14 NOTE — ED PROVIDER NOTES
Subjective   History of Present Illness  62-year-old female presents with a complaint of cough, dyspnea, lingual edema.  She reports that she took one of her daughter's leftover amoxicillin's and began to have an allergic reaction.  She had previously had a Z-Angel and penicillin and had allergic reaction so she was not sure what she was allergic to.    1. Location: Generalized  2. Quality: Dyspneic, Angioedema  3. Severity: Moderate  4. Worsening factors: Exposure to penicillin  5. Alleviating factors: Denies  6. Onset: Prior to arrival  7. Radiation: Denies  8. Frequency: Constant with periods of intensity  9. Co-morbidities: Past Medical History:  No date: COPD (chronic obstructive pulmonary disease) ([facility])      History provided by:  Patient   used: No        Review of Systems   Constitutional: Negative for appetite change, chills, diaphoresis and fever.   HENT: Positive for facial swelling. Negative for drooling, postnasal drip, sore throat, trouble swallowing and voice change.    Respiratory: Positive for shortness of breath.    Cardiovascular: Positive for chest pain.   Gastrointestinal: Negative for abdominal pain, blood in stool, constipation, diarrhea, nausea and vomiting.   Genitourinary: Negative for decreased urine volume, difficulty urinating, flank pain and hematuria.   Musculoskeletal: Negative for neck pain.   Skin: Negative for color change, pallor and rash.   Allergic/Immunologic: Negative for immunocompromised state.   Neurological: Negative for dizziness and syncope.   Hematological: Negative for adenopathy.   Psychiatric/Behavioral: Negative for confusion.   All other systems reviewed and are negative.      Past Medical History:   Diagnosis Date   • COPD (chronic obstructive pulmonary disease) ([facility])        Allergies   Allergen Reactions   • Azithromycin Anaphylaxis   • Naproxen Anaphylaxis   • Penicillin G Swelling   • Penicillins Anaphylaxis and Swelling   • Codeine Nausea And  Vomiting       Past Surgical History:   Procedure Laterality Date   • CHOLECYSTECTOMY     • TUBAL ABDOMINAL LIGATION         No family history on file.    Social History     Socioeconomic History   • Marital status:    Tobacco Use   • Smoking status: Every Day     Packs/day: 1.00     Types: Cigarettes   • Smokeless tobacco: Never   Substance and Sexual Activity   • Alcohol use: Never           Objective   Physical Exam  Vitals and nursing note reviewed.   Constitutional:       General: She is awake. She is in acute distress.      Appearance: Normal appearance. She is well-developed and normal weight. She is not ill-appearing.   HENT:      Head: Normocephalic and atraumatic.      Comments: Periorbital edema noted.  Angioedema of the lips and tongue noted.     Right Ear: External ear normal. No drainage.      Left Ear: External ear normal. No drainage.      Nose: Nose normal. No rhinorrhea.      Mouth/Throat:      Lips: No lesions.      Mouth: Mucous membranes are moist. Angioedema present.      Pharynx: Oropharynx is clear. Uvula midline.   Eyes:      General: Vision grossly intact. Gaze aligned appropriately.      Extraocular Movements: Extraocular movements intact.      Conjunctiva/sclera: Conjunctivae normal.      Pupils: Pupils are equal, round, and reactive to light.   Neck:      Trachea: Trachea and phonation normal.   Cardiovascular:      Rate and Rhythm: Regular rhythm. Tachycardia present.      Pulses: Normal pulses.      Heart sounds: Normal heart sounds, S1 normal and S2 normal. Heart sounds not distant. No murmur heard.    No friction rub. No gallop.   Pulmonary:      Effort: Pulmonary effort is normal. No respiratory distress.      Breath sounds: Normal breath sounds and air entry. No wheezing or rales.   Chest:      Chest wall: No tenderness.   Abdominal:      General: Abdomen is flat. Bowel sounds are normal. There is no distension.      Palpations: Abdomen is soft. There is no mass.       Tenderness: There is no abdominal tenderness. There is no guarding or rebound.      Hernia: No hernia is present.   Musculoskeletal:      Cervical back: Full passive range of motion without pain, normal range of motion and neck supple.   Skin:     General: Skin is warm and dry.      Capillary Refill: Capillary refill takes less than 2 seconds.      Coloration: Skin is not pale.      Findings: No erythema or rash.   Neurological:      Mental Status: She is alert and oriented to person, place, and time.      GCS: GCS eye subscore is 4. GCS verbal subscore is 5. GCS motor subscore is 6.   Psychiatric:         Mood and Affect: Mood normal.         Behavior: Behavior normal. Behavior is cooperative.         Thought Content: Thought content normal.         Judgment: Judgment normal.         Procedures     Sinus tachycardia with old anterior septal infarct rate 103.  Compared to previous EKG from 11/22/2021 sinus rhythm with LAE rate of 88.  Interpreted by Dr. Islas and reviewed by me.      ED Course  ED Course as of 11/14/22 1647   Mon Nov 14, 2022   1112 Patient was reassessed at this time.  Facial angioedema and lingual edema noted to have improved.  She reports that her chest pain has subsided.  We will monitor for an additional 30 minutes to ensure that she has no rebound symptoms. [AL]      ED Course User Index  [AL] Anna Miguel, APRN      XR Chest 1 View    Result Date: 11/14/2022  No acute cardiopulmonary findings.  Electronically Signed By-Kelsi Adkins MD On:11/14/2022 9:24 AM This report was finalized on [id number] by  Kelsi Adkins MD.    Medications   methylPREDNISolone sodium succinate (SOLU-Medrol) injection 125 mg (125 mg Intravenous Given 11/14/22 0840)   diphenhydrAMINE (BENADRYL) injection 25 mg (25 mg Intravenous Given 11/14/22 0841)   EPINEPHrine PF (ADRENALIN) injection 0.3 mg (0.3 mg Subcutaneous Given 11/14/22 0836)   lactated ringers bolus 1,000 mL (0 mL Intravenous Stopped 11/14/22 1129)  "  famotidine (PEPCID) injection 20 mg (20 mg Intravenous Given 11/14/22 0852)   EPINEPHrine PF (ADRENALIN) injection 0.3 mg (0.3 mg Subcutaneous Given 11/14/22 0858)     Labs Reviewed   COMPREHENSIVE METABOLIC PANEL - Abnormal; Notable for the following components:       Result Value    Glucose 114 (*)     Creatinine 0.56 (*)     Alkaline Phosphatase 131 (*)     All other components within normal limits    Narrative:     GFR Normal >60  Chronic Kidney Disease <60  Kidney Failure <15     CBC WITH AUTO DIFFERENTIAL - Abnormal; Notable for the following components:    WBC 11.50 (*)     Neutrophil % 78.0 (*)     Lymphocyte % 14.7 (*)     Neutrophils, Absolute 9.00 (*)     All other components within normal limits   PROCALCITONIN - Normal    Narrative:     As a Marker for Sepsis (Non-Neonates):    1. <0.5 ng/mL represents a low risk of severe sepsis and/or septic shock.  2. >2 ng/mL represents a high risk of severe sepsis and/or septic shock.    As a Marker for Lower Respiratory Tract Infections that require antibiotic therapy:    PCT on Admission    Antibiotic Therapy       6-12 Hrs later    >0.5                Strongly Recommended  >0.25 - <0.5        Recommended   0.1 - 0.25          Discouraged              Remeasure/reassess PCT  <0.1                Strongly Discouraged     Remeasure/reassess PCT    As 28 day mortality risk marker: \"Change in Procalcitonin Result\" (>80% or <=80%) if Day 0 (or Day 1) and Day 4 values are available. Refer to http://www.[facility]-pct-calculator.com    Change in PCT <=80%  A decrease of PCT levels below or equal to 80% defines a positive change in PCT test result representing a higher risk for 28-day all-cause mortality of patients diagnosed with severe sepsis for septic shock.    Change in PCT >80%  A decrease of PCT levels of more than 80% defines a negative change in PCT result representing a lower risk for 28-day all-cause mortality of patients diagnosed with severe sepsis or septic " shock.      TROPONIN (IN-HOUSE) - Normal    Narrative:     Troponin T Reference Range:  <= 0.03 ng/mL-   Negative for AMI  >0.03 ng/mL-     Abnormal for myocardial necrosis.  Clinicians would have to utilize clinical acumen, EKG, Troponin and serial changes to determine if it is an Acute Myocardial Infarction or myocardial injury due to an underlying chronic condition.       Results may be falsely decreased if patient taking Biotin.     BNP (IN-HOUSE) - Normal    Narrative:     Among patients with dyspnea, NT-proBNP is highly sensitive for the detection of acute congestive heart failure. In addition NT-proBNP of <300 pg/ml effectively rules out acute congestive heart failure with 99% negative predictive value.    Results may be falsely decreased if patient taking Biotin.     POC LACTATE - Normal   BLOOD CULTURE   BLOOD CULTURE   POC LACTATE   CBC AND DIFFERENTIAL    Narrative:     The following orders were created for panel order CBC & Differential.  Procedure                               Abnormality         Status                     ---------                               -----------         ------                     CBC Auto Differential[[id number]]        Abnormal            Final result                 Please view results for these tests on the individual orders.   EXTRA TUBES    Narrative:     The following orders were created for panel order Extra Tubes.  Procedure                               Abnormality         Status                     ---------                               -----------         ------                     Gold Top - SST[[id number]]                                   Final result               Light Blue Top[[id number]]                                   Final result                 Please view results for these tests on the individual orders.   GOLD TOP - SST   LIGHT BLUE TOP                                          MDM  Number of Diagnoses or Management Options  Anaphylaxis, initial  encounter  COPD with acute exacerbation (HCC)  Diagnosis management comments: Chart Review: 10/4/2022 patient was seen at this facility for hand laceration.  Comorbidity: Past Medical History:  No date: COPD (chronic obstructive pulmonary disease) (HCC)  Imaging: Was interpreted by physician and reviewed by myself: XR Chest 1 View   Final Result    No acute cardiopulmonary findings.         Electronically Signed By-Kelsi Adkins MD On:11/14/2022 9:24 AM    This report was finalized on [id number] by  Kelsi Adkins MD.    Patient undressed and placed in gown for exam.  Appropriate PPE worn during patient exam.  Patient is alert and oriented x3.  She is anxious on exam.She has periorbital edema, angioedema of lips and tongue.  No drooling noted.  No stridor or wheezing noted.  IV established and labs obtained.  Allergic reaction protocol initiated.  Patient was given lactated Ringer's 1 L bolus, Pepcid 20 mg IV, Benadryl 25 mg IV, and Solu-Medrol 125 mg IV.  Patient was reassessed in 15 minutes and remained with lingual edema and minimal relief of symptoms.  She is given a repeat dose of epi 0.3 mg subcu.  Patient was monitored for total of 3 hours in the ER and angioedema has resolved, hives have decreased, patient reports improvement.  She also reports that she needs prescriptions for her albuterol inhaler and nebulizer.  These were refilled.  Additionally she was given Medrol Dosepak, EpiPen and instructed to take over-the-counter Benadryl and Pepcid.  She did not have primary care established was given follow-up with patient connection to establish care.    Disposition/Treatment: Discussed results with patient, verbalized understanding.  Discussed reasons to return to the ER, patient verbalized understanding.  Agreeable with plan of care.  Patient was stable upon discharge.       Part of this note may be an electronic transcription/translation of spoken language to printed text using the Dragon Dictation  System.            Amount and/or Complexity of Data Reviewed  Clinical lab tests: reviewed  Tests in the radiology section of CPT®: reviewed  Tests in the medicine section of CPT®: reviewed    Risk of Complications, Morbidity, and/or Mortality  General comments: This case was discussed with my attending, Dr. Islas who is agreeable plan of care.    Patient Progress  Patient progress: stable      Final diagnoses:   Anaphylaxis, initial encounter   COPD with acute exacerbation (HCC)       ED Disposition  ED Disposition     ED Disposition   Discharge    Condition   Stable    Comment   --             PATIENT CONNECTION - [facility] [address] [phone]  Schedule an appointment as soon as possible for a visit       [facility] EMERGENCY DEPARTMENT  [address] [address] [address] [phone]  Go to   If symptoms worsen         Medication List      New Prescriptions    EPINEPHrine 0.3 MG/0.3ML solution auto-injector injection  Commonly known as: EPIPEN  Inject 0.3 mL under the skin into the appropriate area as directed 1 (One) Time for 1 dose.     methylPREDNISolone 4 MG dose pack  Commonly known as: MEDROL  Take as directed on package instructions.        Changed    albuterol sulfate  (90 Base) MCG/ACT inhaler  Commonly known as: PROVENTIL HFA;VENTOLIN HFA;PROAIR HFA  Inhale 2 puffs Every 4 (Four) Hours As Needed for Wheezing or Shortness of Air.  What changed:   · medication strength  · how much to take  · when to take this  · reasons to take this  · Another medication with the same name was removed. Continue taking this medication, and follow the directions you see here.     ipratropium-albuterol 0.5-2.5 mg/3 ml nebulizer  Commonly known as: DUO-NEB  Take 3 mL by nebulization Every 4 (Four) Hours As Needed for Wheezing.  What changed:   · how much to take  · how to take this  · when to take this  · reasons to take this           Where to Get Your Medications      These  medications were sent to [id number] - [place], [place] - [address]  - [phone]  - [phone] FX  [address] [place] [address]    Phone: [phone]   · albuterol sulfate  (90 Base) MCG/ACT inhaler  · EPINEPHrine 0.3 MG/0.3ML solution auto-injector injection  · ipratropium-albuterol 0.5-2.5 mg/3 ml nebulizer  · methylPREDNISolone 4 MG dose pack          Anna Miguel, APRN  11/14/22 0868

## 2022-11-14 NOTE — DISCHARGE INSTRUCTIONS
As discussed, avoid penicillin in the future.  I recommend that you stop smoking.  Use EpiPen if you develop swelling of your lips and tongue again.  Take Medrol Dosepak as prescribed.  Take Benadryl and Pepcid per over-the-counter instruction for at least the next 2 to 3 days.  Take albuterol inhaler and use nebulizer as directed.  Call patient connection to establish care and schedule follow-up.  Return to the ER for new or worsening symptoms.

## 2022-11-16 LAB
QT INTERVAL: 335 MS
QT INTERVAL: 378 MS

## 2022-11-19 LAB
BACTERIA SPEC AEROBE CULT: NORMAL
BACTERIA SPEC AEROBE CULT: NORMAL

## 2022-12-23 ENCOUNTER — HOSPITAL ENCOUNTER (EMERGENCY)
Facility: HOSPITAL | Age: 62
Discharge: HOME | End: 2022-12-23
Attending: EMERGENCY MEDICINE | Admitting: EMERGENCY MEDICINE

## 2022-12-23 ENCOUNTER — APPOINTMENT (OUTPATIENT)
Dept: GENERAL RADIOLOGY | Facility: HOSPITAL | Age: 62
End: 2022-12-23

## 2022-12-23 VITALS
WEIGHT: 144.2 LBS | HEIGHT: 59 IN | OXYGEN SATURATION: 92 % | SYSTOLIC BLOOD PRESSURE: 119 MMHG | RESPIRATION RATE: 18 BRPM | DIASTOLIC BLOOD PRESSURE: 61 MMHG | TEMPERATURE: 98.1 F | HEART RATE: 106 BPM | BODY MASS INDEX: 29.07 KG/M2

## 2022-12-23 DIAGNOSIS — R05.1 ACUTE COUGH: ICD-10-CM

## 2022-12-23 DIAGNOSIS — R50.9 FEVER, UNSPECIFIED FEVER CAUSE: ICD-10-CM

## 2022-12-23 DIAGNOSIS — J10.1 INFLUENZA A: Primary | ICD-10-CM

## 2022-12-23 LAB
ALBUMIN SERPL-MCNC: 4 G/DL (ref 3.5–5.2)
ALBUMIN/GLOB SERPL: 1.4 G/DL
ALP SERPL-CCNC: 120 U/L (ref 39–117)
ALT SERPL W P-5'-P-CCNC: 12 U/L (ref 1–33)
ANION GAP SERPL CALCULATED.3IONS-SCNC: 12 MMOL/L (ref 5–15)
AST SERPL-CCNC: 19 U/L (ref 1–32)
B PARAPERT DNA SPEC QL NAA+PROBE: NOT DETECTED
B PERT DNA SPEC QL NAA+PROBE: NOT DETECTED
BASOPHILS # BLD AUTO: 0 10*3/MM3 (ref 0–0.2)
BASOPHILS NFR BLD AUTO: 0.7 % (ref 0–1.5)
BILIRUB SERPL-MCNC: 0.2 MG/DL (ref 0–1.2)
BUN SERPL-MCNC: 15 MG/DL (ref 8–23)
BUN/CREAT SERPL: 27.3 (ref 7–25)
C PNEUM DNA NPH QL NAA+NON-PROBE: NOT DETECTED
CALCIUM SPEC-SCNC: 8.9 MG/DL (ref 8.6–10.5)
CHLORIDE SERPL-SCNC: 102 MMOL/L (ref 98–107)
CO2 SERPL-SCNC: 22 MMOL/L (ref 22–29)
CREAT SERPL-MCNC: 0.55 MG/DL (ref 0.57–1)
DEPRECATED RDW RBC AUTO: 43.3 FL (ref 37–54)
EGFRCR SERPLBLD CKD-EPI 2021: 103.1 ML/MIN/1.73
EOSINOPHIL # BLD AUTO: 0 10*3/MM3 (ref 0–0.4)
EOSINOPHIL NFR BLD AUTO: 0.7 % (ref 0.3–6.2)
ERYTHROCYTE [DISTWIDTH] IN BLOOD BY AUTOMATED COUNT: 14.3 % (ref 12.3–15.4)
FLUAV SUBTYP SPEC NAA+PROBE: DETECTED
FLUBV RNA ISLT QL NAA+PROBE: NOT DETECTED
GLOBULIN UR ELPH-MCNC: 2.8 GM/DL
GLUCOSE SERPL-MCNC: 92 MG/DL (ref 65–99)
HADV DNA SPEC NAA+PROBE: NOT DETECTED
HCOV 229E RNA SPEC QL NAA+PROBE: NOT DETECTED
HCOV HKU1 RNA SPEC QL NAA+PROBE: NOT DETECTED
HCOV NL63 RNA SPEC QL NAA+PROBE: NOT DETECTED
HCOV OC43 RNA SPEC QL NAA+PROBE: NOT DETECTED
HCT VFR BLD AUTO: 40.8 % (ref 34–46.6)
HGB BLD-MCNC: 12.9 G/DL (ref 12–15.9)
HMPV RNA NPH QL NAA+NON-PROBE: NOT DETECTED
HPIV1 RNA ISLT QL NAA+PROBE: NOT DETECTED
HPIV2 RNA SPEC QL NAA+PROBE: NOT DETECTED
HPIV3 RNA NPH QL NAA+PROBE: NOT DETECTED
HPIV4 P GENE NPH QL NAA+PROBE: NOT DETECTED
LYMPHOCYTES # BLD AUTO: 0.3 10*3/MM3 (ref 0.7–3.1)
LYMPHOCYTES NFR BLD AUTO: 5.3 % (ref 19.6–45.3)
M PNEUMO IGG SER IA-ACNC: NOT DETECTED
MCH RBC QN AUTO: 27.4 PG (ref 26.6–33)
MCHC RBC AUTO-ENTMCNC: 31.7 G/DL (ref 31.5–35.7)
MCV RBC AUTO: 86.5 FL (ref 79–97)
MONOCYTES # BLD AUTO: 0.3 10*3/MM3 (ref 0.1–0.9)
MONOCYTES NFR BLD AUTO: 5.1 % (ref 5–12)
NEUTROPHILS NFR BLD AUTO: 5.4 10*3/MM3 (ref 1.7–7)
NEUTROPHILS NFR BLD AUTO: 88.2 % (ref 42.7–76)
NRBC BLD AUTO-RTO: 0.1 /100 WBC (ref 0–0.2)
NT-PROBNP SERPL-MCNC: 200.6 PG/ML (ref 0–900)
PLATELET # BLD AUTO: 254 10*3/MM3 (ref 140–450)
PMV BLD AUTO: 8.2 FL (ref 6–12)
POTASSIUM SERPL-SCNC: 4 MMOL/L (ref 3.5–5.2)
PROT SERPL-MCNC: 6.8 G/DL (ref 6–8.5)
QT INTERVAL: 330 MS
RBC # BLD AUTO: 4.71 10*6/MM3 (ref 3.77–5.28)
RHINOVIRUS RNA SPEC NAA+PROBE: NOT DETECTED
RSV RNA NPH QL NAA+NON-PROBE: NOT DETECTED
SARS-COV-2 RNA PNL SPEC NAA+PROBE: NOT DETECTED
SODIUM SERPL-SCNC: 136 MMOL/L (ref 136–145)
TROPONIN T SERPL-MCNC: <0.01 NG/ML (ref 0–0.03)
WBC NRBC COR # BLD: 6.1 10*3/MM3 (ref 3.4–10.8)

## 2022-12-23 PROCEDURE — 94640 AIRWAY INHALATION TREATMENT: CPT

## 2022-12-23 PROCEDURE — 80053 COMPREHEN METABOLIC PANEL: CPT | Performed by: PHYSICIAN ASSISTANT

## 2022-12-23 PROCEDURE — 99284 EMERGENCY DEPT VISIT MOD MDM: CPT

## 2022-12-23 PROCEDURE — 93005 ELECTROCARDIOGRAM TRACING: CPT | Performed by: PHYSICIAN ASSISTANT

## 2022-12-23 PROCEDURE — 71045 X-RAY EXAM CHEST 1 VIEW: CPT

## 2022-12-23 PROCEDURE — 0202U NFCT DS 22 TRGT SARS-COV-2: CPT | Performed by: PHYSICIAN ASSISTANT

## 2022-12-23 PROCEDURE — 25010000002 METHYLPREDNISOLONE PER 125 MG: Performed by: PHYSICIAN ASSISTANT

## 2022-12-23 PROCEDURE — 84484 ASSAY OF TROPONIN QUANT: CPT | Performed by: PHYSICIAN ASSISTANT

## 2022-12-23 PROCEDURE — 85025 COMPLETE CBC W/AUTO DIFF WBC: CPT | Performed by: PHYSICIAN ASSISTANT

## 2022-12-23 PROCEDURE — 83880 ASSAY OF NATRIURETIC PEPTIDE: CPT | Performed by: PHYSICIAN ASSISTANT

## 2022-12-23 PROCEDURE — 94799 UNLISTED PULMONARY SVC/PX: CPT

## 2022-12-23 PROCEDURE — 96374 THER/PROPH/DIAG INJ IV PUSH: CPT

## 2022-12-23 PROCEDURE — 94761 N-INVAS EAR/PLS OXIMETRY MLT: CPT

## 2022-12-23 RX ORDER — METHYLPREDNISOLONE SODIUM SUCCINATE 125 MG/2ML
125 INJECTION, POWDER, LYOPHILIZED, FOR SOLUTION INTRAMUSCULAR; INTRAVENOUS ONCE
Status: COMPLETED | OUTPATIENT
Start: 2022-12-23 | End: 2022-12-23

## 2022-12-23 RX ORDER — BROMPHENIRAMINE MALEATE, PSEUDOEPHEDRINE HYDROCHLORIDE, AND DEXTROMETHORPHAN HYDROBROMIDE 2; 30; 10 MG/5ML; MG/5ML; MG/5ML
5 SYRUP ORAL 4 TIMES DAILY PRN
Qty: 473 ML | Refills: 0 | Status: SHIPPED | OUTPATIENT
Start: 2022-12-23 | End: 2022-12-23 | Stop reason: SDUPTHER

## 2022-12-23 RX ORDER — OSELTAMIVIR PHOSPHATE 75 MG/1
75 CAPSULE ORAL 2 TIMES DAILY
Qty: 10 CAPSULE | Refills: 0 | Status: SHIPPED | OUTPATIENT
Start: 2022-12-23 | End: 2022-12-30

## 2022-12-23 RX ORDER — BENZONATATE 200 MG/1
200 CAPSULE ORAL 3 TIMES DAILY PRN
Qty: 30 CAPSULE | Refills: 0 | Status: SHIPPED | OUTPATIENT
Start: 2022-12-23 | End: 2022-12-23 | Stop reason: SDUPTHER

## 2022-12-23 RX ORDER — ALBUTEROL SULFATE 90 UG/1
2 AEROSOL, METERED RESPIRATORY (INHALATION) ONCE
Status: COMPLETED | OUTPATIENT
Start: 2022-12-23 | End: 2022-12-23

## 2022-12-23 RX ORDER — ACETAMINOPHEN 500 MG
1000 TABLET ORAL ONCE
Status: COMPLETED | OUTPATIENT
Start: 2022-12-23 | End: 2022-12-23

## 2022-12-23 RX ORDER — SODIUM CHLORIDE 0.9 % (FLUSH) 0.9 %
10 SYRINGE (ML) INJECTION AS NEEDED
Status: DISCONTINUED | OUTPATIENT
Start: 2022-12-23 | End: 2022-12-23 | Stop reason: HOSPADM

## 2022-12-23 RX ORDER — BENZONATATE 200 MG/1
200 CAPSULE ORAL 3 TIMES DAILY PRN
Qty: 30 CAPSULE | Refills: 0 | Status: SHIPPED | OUTPATIENT
Start: 2022-12-23

## 2022-12-23 RX ORDER — BROMPHENIRAMINE MALEATE, PSEUDOEPHEDRINE HYDROCHLORIDE, AND DEXTROMETHORPHAN HYDROBROMIDE 2; 30; 10 MG/5ML; MG/5ML; MG/5ML
5 SYRUP ORAL 4 TIMES DAILY PRN
Qty: 473 ML | Refills: 0 | Status: SHIPPED | OUTPATIENT
Start: 2022-12-23 | End: 2023-03-07

## 2022-12-23 RX ORDER — OSELTAMIVIR PHOSPHATE 75 MG/1
75 CAPSULE ORAL 2 TIMES DAILY
Qty: 10 CAPSULE | Refills: 0 | Status: SHIPPED | OUTPATIENT
Start: 2022-12-23 | End: 2022-12-23 | Stop reason: SDUPTHER

## 2022-12-23 RX ADMIN — ACETAMINOPHEN 1000 MG: 500 TABLET ORAL at 09:57

## 2022-12-23 RX ADMIN — METHYLPREDNISOLONE SODIUM SUCCINATE 125 MG: 125 INJECTION, POWDER, FOR SOLUTION INTRAMUSCULAR; INTRAVENOUS at 09:58

## 2022-12-23 RX ADMIN — ALBUTEROL SULFATE 2 PUFF: 108 INHALANT RESPIRATORY (INHALATION) at 09:54

## 2022-12-23 NOTE — ED PROVIDER NOTES
"Subjective   History of Present Illness  Patient is a 62-year-old female presents to the ED with complaints of body aches, chills, and subjective fever that started in the middle the night.  Patient states she did take ibuprofen at around 7 AM this morning with improvement of her chills.  She reports associated cough, rhinorrhea, and nasal congestion.  She reports \"lung spasms\" and shortness of breath.  She does report history of COPD.  Patient states she did take breathing treatment this morning with some relief of her symptoms.  Patient denies any significant chest pain, abdominal pain, nausea, vomiting, diarrhea.  Patient states that her grandchildren recently had strep throat.  She reports minimal sore throat a few days ago but denies one currently.        Review of Systems   Constitutional: Positive for chills and fever. Negative for activity change, appetite change, diaphoresis, fatigue and unexpected weight change.   HENT: Positive for congestion and rhinorrhea. Negative for ear discharge, ear pain, nosebleeds, sinus pressure, sinus pain, sore throat, trouble swallowing and voice change.    Eyes: Negative.    Respiratory: Positive for cough, chest tightness and shortness of breath. Negative for apnea, choking, wheezing and stridor.    Cardiovascular: Negative.    Gastrointestinal: Negative for abdominal distention, abdominal pain, diarrhea, nausea and vomiting.   Genitourinary: Negative for dysuria.   Musculoskeletal: Negative for back pain, neck pain and neck stiffness.   Skin: Negative.    Neurological: Positive for headaches. Negative for dizziness, seizures, syncope, weakness and light-headedness.   Hematological: Does not bruise/bleed easily.       Past Medical History:   Diagnosis Date   • COPD (chronic obstructive pulmonary disease) (HCC)        Allergies   Allergen Reactions   • Azithromycin Anaphylaxis   • Naproxen Anaphylaxis   • Penicillin G Swelling   • Penicillins Anaphylaxis and Swelling   • " Codeine Nausea And Vomiting       Past Surgical History:   Procedure Laterality Date   • CHOLECYSTECTOMY     • TUBAL ABDOMINAL LIGATION         No family history on file.    Social History     Socioeconomic History   • Marital status:    Tobacco Use   • Smoking status: Every Day     Packs/day: 1.00     Types: Cigarettes   • Smokeless tobacco: Never   Substance and Sexual Activity   • Alcohol use: Never           Objective   Physical Exam  Vitals and nursing note reviewed.   Constitutional:       General: She is not in acute distress.     Appearance: She is well-developed. She is not ill-appearing, toxic-appearing or diaphoretic.   HENT:      Head: Normocephalic and atraumatic.      Right Ear: Tympanic membrane, ear canal and external ear normal.      Left Ear: Tympanic membrane, ear canal and external ear normal.      Nose: Nose normal.      Mouth/Throat:      Mouth: Mucous membranes are moist.      Pharynx: Oropharynx is clear. Uvula midline. No pharyngeal swelling, oropharyngeal exudate, posterior oropharyngeal erythema or uvula swelling.      Tonsils: No tonsillar exudate or tonsillar abscesses.   Eyes:      General: No scleral icterus.     Extraocular Movements: Extraocular movements intact.      Pupils: Pupils are equal, round, and reactive to light.   Cardiovascular:      Rate and Rhythm: Regular rhythm. Tachycardia present.      Pulses: Normal pulses.      Heart sounds: No murmur heard.    No friction rub. No gallop.   Pulmonary:      Effort: Pulmonary effort is normal. No respiratory distress.      Breath sounds: Normal breath sounds. No stridor. No wheezing, rhonchi or rales.   Chest:      Chest wall: No tenderness.   Abdominal:      General: Bowel sounds are normal. There is no distension. There are no signs of injury.      Palpations: Abdomen is soft.      Tenderness: There is no abdominal tenderness. There is no guarding or rebound.   Musculoskeletal:      Cervical back: Normal range of motion and  "neck supple. No rigidity.   Skin:     General: Skin is warm.      Capillary Refill: Capillary refill takes less than 2 seconds.      Coloration: Skin is not cyanotic, jaundiced or pale.      Findings: No rash.   Neurological:      General: No focal deficit present.      Mental Status: She is alert and oriented to person, place, and time.   Psychiatric:         Mood and Affect: Mood normal.         Behavior: Behavior normal.         Procedures           ED Course  ED Course as of 12/23/22 1159   Fri Dec 23, 2022   1035 Creatinine(!): 0.55  Chronic   [AA]   1106 Called lab in regards to patient's respiratory panel.  Influenza came off as \"equivocal\" they are rerunning the swab [AA]   1125 Influenza A PCR(!): Detected [AA]      ED Course User Index  [AA] René Last PA    /61   Pulse 106   Temp 98.1 °F (36.7 °C) (Oral)   Resp 18   Ht 149.9 cm (59\")   Wt 65.4 kg (144 lb 3.2 oz)   SpO2 92%   BMI 29.12 kg/m²   Medications   sodium chloride 0.9 % flush 10 mL (has no administration in time range)   methylPREDNISolone sodium succinate (SOLU-Medrol) injection 125 mg (125 mg Intravenous Given 12/23/22 0958)   albuterol sulfate HFA (PROVENTIL HFA;VENTOLIN HFA;PROAIR HFA) inhaler 2 puff (2 puffs Inhalation Given 12/23/22 0954)   acetaminophen (TYLENOL) tablet 1,000 mg (1,000 mg Oral Given 12/23/22 0957)     Labs Reviewed   RESPIRATORY PANEL PCR W/ COVID-19 (SARS-COV-2) NICK/ANISH/SYLVIA/PAD/COR/MAD/MIGUEL IN-HOUSE, NP SWAB IN [facility], 3-4 HR TAT - Abnormal; Notable for the following components:       Result Value    Influenza A PCR Detected (*)     All other components within normal limits    Narrative:     In the setting of a positive respiratory panel with a viral infection PLUS a negative procalcitonin without other underlying concern for bacterial infection, consider observing off antibiotics or discontinuation of antibiotics and continue supportive care. If the respiratory panel is positive for atypical " bacterial infection (Bordetella pertussis, Chlamydophila pneumoniae, or Mycoplasma pneumoniae), consider antibiotic de-escalation to target atypical bacterial infection.   COMPREHENSIVE METABOLIC PANEL - Abnormal; Notable for the following components:    Creatinine 0.55 (*)     Alkaline Phosphatase 120 (*)     BUN/Creatinine Ratio 27.3 (*)     All other components within normal limits    Narrative:     GFR Normal >60  Chronic Kidney Disease <60  Kidney Failure <15     CBC WITH AUTO DIFFERENTIAL - Abnormal; Notable for the following components:    Neutrophil % 88.2 (*)     Lymphocyte % 5.3 (*)     Lymphocytes, Absolute 0.30 (*)     All other components within normal limits   BNP (IN-HOUSE) - Normal    Narrative:     Among patients with dyspnea, NT-proBNP is highly sensitive for the detection of acute congestive heart failure. In addition NT-proBNP of <300 pg/ml effectively rules out acute congestive heart failure with 99% negative predictive value.     TROPONIN (IN-HOUSE) - Normal    Narrative:     Troponin T Reference Range:  <= 0.03 ng/mL-   Negative for AMI  >0.03 ng/mL-     Abnormal for myocardial necrosis.  Clinicians would have to utilize clinical acumen, EKG, Troponin and serial changes to determine if it is an Acute Myocardial Infarction or myocardial injury due to an underlying chronic condition.       Results may be falsely decreased if patient taking Biotin.     CBC AND DIFFERENTIAL    Narrative:     The following orders were created for panel order CBC & Differential.  Procedure                               Abnormality         Status                     ---------                               -----------         ------                     CBC Auto Differential[[id number]]        Abnormal            Final result                 Please view results for these tests on the individual orders.     XR Chest 1 View    Result Date: 12/23/2022  Impression: Advanced emphysema. No acute chest finding. Electronically  Signed: Kelsi Adkins  12/23/2022 10:08 AM EST  Workstation ID: [id number]                                           [facility]  Number of Diagnoses or Management Options  Acute cough  Influenza A  Diagnosis management comments: Chart Review:  Comorbidity: As per past medical history  Differentials: Pneumonia bronchitis URI COPD exacerbation viral illness     ;this list is not all inclusive and does not constitute the entirety of considered causes  ECG: Reviewed by myself interpreted by Dr. Meléndez shows sinus tachycardia 115 otherwise normal EKG previous reviewed from 11/14/2020 showed sinus rhythm.  Labs:as above   Imaging: Was interpreted by physician and reviewed by myself:  XR Chest 1 View   Final Result    Impression:    Advanced emphysema. No acute chest finding.    Electronically Signed: Kelsi Adkins      12/23/2022 10:08 AM EST      Workstation ID: [id number]     Disposition/Treatment:  Appropriate PPE was worn during exam and throughout all encounters with the patient.  While in the ED IV was placed and labs were obtained patient was placed on proper monitor she was afebrile upon arrival to the ED and appeared nontoxic, she was not hypoxic, she was tachycardic upon arrival to the ED in the 120s with improvement now in the 110's during her ED stay.  Patient was given Solu-Medrol breathing treatment and Tylenol.  Chest x-ray showed no acute infiltrates.  Lab results including CBC metabolic panel troponin and BNP were all fairly unremarkable as above.  Patient's ED stay was prolonged secondary to respiratory panel having to be rerun by lab.  Respiratory panel was significant for influenza a likely cause of patient symptoms today.    Findings were discussed with the patient and family at bedside who are in agreement with plan of discharge.  Patient will be sent home with Tamiflu Tessalon Perles and Bromfed.  Advised to continue breathing treatments at home.  Patient voiced understanding of discharge along with signs and  symptoms to return.  She was in agreement with plan.  All questions were answered.    This document is intended for medical expert use only. Reading of this document by patients and/or patient's family without participating medical staff guidance may result in misinterpretation and unintended morbidity.  Any interpretation of such data is the responsibility of the patient and/or family member responsible for the patient in concert with their primary or specialist providers, not to be left for sources of online searches such as "[employer]", [employer] or similar queries. Relying on these approaches to knowledge may result in misinterpretation, misguided goals of care and even death should patients or family members try recommendations outside of the realm of professional medical care in a supervised inpatient environment.          Amount and/or Complexity of Data Reviewed  Clinical lab tests: reviewed  Tests in the radiology section of CPT®: reviewed  Tests in the medicine section of CPT®: reviewed        Final diagnoses:   Influenza A   Acute cough   Fever, unspecified fever cause       ED Disposition  ED Disposition     ED Disposition   Discharge    Condition   Stable    Comment   --             [facility] EMERGENCY DEPARTMENT  [address] [address] [phone]  Go to   If symptoms worsen    PATIENT CONNECTION - [facility] [address]  [phone]  Call   If you do not have a primary care provider         Medication List      New Prescriptions    benzonatate 200 MG capsule  Commonly known as: TESSALON  Take 1 capsule by mouth 3 (Three) Times a Day As Needed for Cough.     brompheniramine-pseudoephedrine-DM 30-2-10 MG/5ML syrup  Take 5 mL by mouth 4 (Four) Times a Day As Needed for Congestion or Cough.     oseltamivir 75 MG capsule  Commonly known as: TAMIFLU  Take 1 capsule by mouth 2 (Two) Times a Day.        Stop    methylPREDNISolone 4 MG dose pack  Commonly known as: MEDROL            Where to Get Your Medications      These medications were sent to [facility] [address] [place] [address]    Hours: Mon-Fri 7:00AM-7:00PM Phone: [phone]   · benzonatate 200 MG capsule  · brompheniramine-pseudoephedrine-DM 30-2-10 MG/5ML syrup  · oseltamivir 75 MG capsule          René Last PA  12/23/22 6128

## 2022-12-23 NOTE — DISCHARGE INSTRUCTIONS
Take medications as directed.    Follow-up with your primary care provider in 3-5 days.  If you do not have a primary care provider call [phone] for help in finding one, or you may follow up with [facility] at [phone].    Return to ED for any new or worsening symptoms.    Tylenol or ibuprofen as needed for fever pain.  Drink plenty of clear fluids and get plenty of rest.

## 2022-12-29 ENCOUNTER — APPOINTMENT (OUTPATIENT)
Dept: GENERAL RADIOLOGY | Facility: HOSPITAL | Age: 62
End: 2022-12-29
Payer: COMMERCIAL

## 2022-12-29 ENCOUNTER — HOSPITAL ENCOUNTER (OUTPATIENT)
Facility: HOSPITAL | Age: 62
Setting detail: OBSERVATION
Discharge: HOME | End: 2022-12-31
Attending: EMERGENCY MEDICINE | Admitting: HOSPITALIST
Payer: COMMERCIAL

## 2022-12-29 DIAGNOSIS — J18.9 PNEUMONIA DUE TO INFECTIOUS ORGANISM, UNSPECIFIED LATERALITY, UNSPECIFIED PART OF LUNG: Primary | ICD-10-CM

## 2022-12-29 DIAGNOSIS — R06.00 DYSPNEA, UNSPECIFIED TYPE: ICD-10-CM

## 2022-12-29 DIAGNOSIS — R09.02 HYPOXIA: ICD-10-CM

## 2022-12-29 PROBLEM — J44.1 COPD EXACERBATION (HCC): Status: ACTIVE | Noted: 2022-12-29

## 2022-12-29 PROBLEM — Z72.0 TOBACCO ABUSE: Status: ACTIVE | Noted: 2022-12-29

## 2022-12-29 LAB
ALBUMIN SERPL-MCNC: 3.7 G/DL (ref 3.5–5.2)
ALBUMIN/GLOB SERPL: 1.4 G/DL
ALP SERPL-CCNC: 99 U/L (ref 39–117)
ALT SERPL W P-5'-P-CCNC: 12 U/L (ref 1–33)
ANION GAP SERPL CALCULATED.3IONS-SCNC: 10 MMOL/L (ref 5–15)
ARTERIAL PATENCY WRIST A: POSITIVE
ARTERIAL PATENCY WRIST A: POSITIVE
AST SERPL-CCNC: 22 U/L (ref 1–32)
ATMOSPHERIC PRESS: ABNORMAL MM[HG]
ATMOSPHERIC PRESS: ABNORMAL MM[HG]
B PARAPERT DNA SPEC QL NAA+PROBE: NOT DETECTED
B PERT DNA SPEC QL NAA+PROBE: NOT DETECTED
BASE EXCESS BLDA CALC-SCNC: 2.5 MMOL/L (ref 0–3)
BASE EXCESS BLDA CALC-SCNC: 3.5 MMOL/L (ref 0–3)
BASOPHILS # BLD AUTO: 0.1 10*3/MM3 (ref 0–0.2)
BASOPHILS NFR BLD AUTO: 1.7 % (ref 0–1.5)
BDY SITE: ABNORMAL
BDY SITE: ABNORMAL
BILIRUB SERPL-MCNC: 0.2 MG/DL (ref 0–1.2)
BUN SERPL-MCNC: 10 MG/DL (ref 8–23)
BUN/CREAT SERPL: 14.5 (ref 7–25)
C PNEUM DNA NPH QL NAA+NON-PROBE: NOT DETECTED
CALCIUM SPEC-SCNC: 8.5 MG/DL (ref 8.6–10.5)
CHLORIDE SERPL-SCNC: 102 MMOL/L (ref 98–107)
CO2 BLDA-SCNC: 29.1 MMOL/L (ref 22–29)
CO2 BLDA-SCNC: 29.5 MMOL/L (ref 22–29)
CO2 SERPL-SCNC: 29 MMOL/L (ref 22–29)
CREAT SERPL-MCNC: 0.69 MG/DL (ref 0.57–1)
D-LACTATE SERPL-SCNC: 0.4 MMOL/L (ref 0.5–2)
D-LACTATE SERPL-SCNC: 0.5 MMOL/L (ref 0.5–2)
DEPRECATED RDW RBC AUTO: 41.6 FL (ref 37–54)
EGFRCR SERPLBLD CKD-EPI 2021: 97.7 ML/MIN/1.73
EOSINOPHIL # BLD AUTO: 0 10*3/MM3 (ref 0–0.4)
EOSINOPHIL NFR BLD AUTO: 0.3 % (ref 0.3–6.2)
ERYTHROCYTE [DISTWIDTH] IN BLOOD BY AUTOMATED COUNT: 14.2 % (ref 12.3–15.4)
FLUAV SUBTYP SPEC NAA+PROBE: NOT DETECTED
FLUBV RNA ISLT QL NAA+PROBE: NOT DETECTED
GLOBULIN UR ELPH-MCNC: 2.7 GM/DL
GLUCOSE SERPL-MCNC: 102 MG/DL (ref 65–99)
HADV DNA SPEC NAA+PROBE: NOT DETECTED
HCO3 BLDA-SCNC: 27.7 MMOL/L (ref 21–28)
HCO3 BLDA-SCNC: 28.2 MMOL/L (ref 21–28)
HCOV 229E RNA SPEC QL NAA+PROBE: NOT DETECTED
HCOV HKU1 RNA SPEC QL NAA+PROBE: NOT DETECTED
HCOV NL63 RNA SPEC QL NAA+PROBE: NOT DETECTED
HCOV OC43 RNA SPEC QL NAA+PROBE: NOT DETECTED
HCT VFR BLD AUTO: 37.9 % (ref 34–46.6)
HEMODILUTION: NO
HEMODILUTION: NO
HGB BLD-MCNC: 12.3 G/DL (ref 12–15.9)
HMPV RNA NPH QL NAA+NON-PROBE: NOT DETECTED
HOLD SPECIMEN: NORMAL
HOLD SPECIMEN: NORMAL
HPIV1 RNA ISLT QL NAA+PROBE: NOT DETECTED
HPIV2 RNA SPEC QL NAA+PROBE: NOT DETECTED
HPIV3 RNA NPH QL NAA+PROBE: NOT DETECTED
HPIV4 P GENE NPH QL NAA+PROBE: NOT DETECTED
INHALED O2 CONCENTRATION: 32 %
INHALED O2 CONCENTRATION: 40 %
L PNEUMO1 AG UR QL IA: NEGATIVE
LYMPHOCYTES # BLD AUTO: 1.6 10*3/MM3 (ref 0.7–3.1)
LYMPHOCYTES NFR BLD AUTO: 36.9 % (ref 19.6–45.3)
M PNEUMO IGG SER IA-ACNC: NOT DETECTED
MCH RBC QN AUTO: 27.6 PG (ref 26.6–33)
MCHC RBC AUTO-ENTMCNC: 32.5 G/DL (ref 31.5–35.7)
MCV RBC AUTO: 84.8 FL (ref 79–97)
MODALITY: ABNORMAL
MODALITY: ABNORMAL
MONOCYTES # BLD AUTO: 0.6 10*3/MM3 (ref 0.1–0.9)
MONOCYTES NFR BLD AUTO: 13.3 % (ref 5–12)
NEUTROPHILS NFR BLD AUTO: 2 10*3/MM3 (ref 1.7–7)
NEUTROPHILS NFR BLD AUTO: 47.8 % (ref 42.7–76)
NRBC BLD AUTO-RTO: 0 /100 WBC (ref 0–0.2)
NT-PROBNP SERPL-MCNC: 126.9 PG/ML (ref 0–900)
PCO2 BLDA: 42.1 MM HG (ref 35–48)
PCO2 BLDA: 44.1 MM HG (ref 35–48)
PH BLDA: 7.41 PH UNITS (ref 7.35–7.45)
PH BLDA: 7.43 PH UNITS (ref 7.35–7.45)
PLATELET # BLD AUTO: 230 10*3/MM3 (ref 140–450)
PMV BLD AUTO: 7.7 FL (ref 6–12)
PO2 BLDA: 161.8 MM HG (ref 83–108)
PO2 BLDA: 74.2 MM HG (ref 83–108)
POTASSIUM SERPL-SCNC: 3.4 MMOL/L (ref 3.5–5.2)
PROCALCITONIN SERPL-MCNC: 0.02 NG/ML (ref 0–0.25)
PROT SERPL-MCNC: 6.4 G/DL (ref 6–8.5)
RBC # BLD AUTO: 4.47 10*6/MM3 (ref 3.77–5.28)
RHINOVIRUS RNA SPEC NAA+PROBE: NOT DETECTED
RSV RNA NPH QL NAA+NON-PROBE: NOT DETECTED
S PNEUM AG SPEC QL LA: NEGATIVE
SAO2 % BLDCOA: 94.7 % (ref 94–98)
SAO2 % BLDCOA: 99.5 % (ref 94–98)
SARS-COV-2 RNA NPH QL NAA+NON-PROBE: NOT DETECTED
SODIUM SERPL-SCNC: 141 MMOL/L (ref 136–145)
TROPONIN T SERPL-MCNC: <0.01 NG/ML (ref 0–0.03)
WBC NRBC COR # BLD: 4.2 10*3/MM3 (ref 3.4–10.8)
WHOLE BLOOD HOLD COAG: NORMAL
WHOLE BLOOD HOLD SPECIMEN: NORMAL

## 2022-12-29 PROCEDURE — 87040 BLOOD CULTURE FOR BACTERIA: CPT | Performed by: EMERGENCY MEDICINE

## 2022-12-29 PROCEDURE — 96366 THER/PROPH/DIAG IV INF ADDON: CPT

## 2022-12-29 PROCEDURE — 25010000002 ONDANSETRON PER 1 MG

## 2022-12-29 PROCEDURE — 80053 COMPREHEN METABOLIC PANEL: CPT | Performed by: EMERGENCY MEDICINE

## 2022-12-29 PROCEDURE — 93005 ELECTROCARDIOGRAM TRACING: CPT | Performed by: NURSE PRACTITIONER

## 2022-12-29 PROCEDURE — 83880 ASSAY OF NATRIURETIC PEPTIDE: CPT | Performed by: NURSE PRACTITIONER

## 2022-12-29 PROCEDURE — 0202U NFCT DS 22 TRGT SARS-COV-2: CPT | Performed by: EMERGENCY MEDICINE

## 2022-12-29 PROCEDURE — 25010000002 METHYLPREDNISOLONE PER 125 MG: Performed by: NURSE PRACTITIONER

## 2022-12-29 PROCEDURE — 36600 WITHDRAWAL OF ARTERIAL BLOOD: CPT

## 2022-12-29 PROCEDURE — 94640 AIRWAY INHALATION TREATMENT: CPT

## 2022-12-29 PROCEDURE — 25010000002 LEVOFLOXACIN PER 250 MG: Performed by: NURSE PRACTITIONER

## 2022-12-29 PROCEDURE — 83605 ASSAY OF LACTIC ACID: CPT

## 2022-12-29 PROCEDURE — 85025 COMPLETE CBC W/AUTO DIFF WBC: CPT | Performed by: EMERGENCY MEDICINE

## 2022-12-29 PROCEDURE — 99285 EMERGENCY DEPT VISIT HI MDM: CPT

## 2022-12-29 PROCEDURE — G0378 HOSPITAL OBSERVATION PER HR: HCPCS

## 2022-12-29 PROCEDURE — 84484 ASSAY OF TROPONIN QUANT: CPT | Performed by: NURSE PRACTITIONER

## 2022-12-29 PROCEDURE — 87449 NOS EACH ORGANISM AG IA: CPT

## 2022-12-29 PROCEDURE — 82803 BLOOD GASES ANY COMBINATION: CPT

## 2022-12-29 PROCEDURE — 84145 PROCALCITONIN (PCT): CPT | Performed by: NURSE PRACTITIONER

## 2022-12-29 PROCEDURE — 96375 TX/PRO/DX INJ NEW DRUG ADDON: CPT

## 2022-12-29 PROCEDURE — 96365 THER/PROPH/DIAG IV INF INIT: CPT

## 2022-12-29 PROCEDURE — 83605 ASSAY OF LACTIC ACID: CPT | Performed by: NURSE PRACTITIONER

## 2022-12-29 PROCEDURE — 71045 X-RAY EXAM CHEST 1 VIEW: CPT

## 2022-12-29 RX ORDER — ONDANSETRON 2 MG/ML
4 INJECTION INTRAMUSCULAR; INTRAVENOUS EVERY 6 HOURS PRN
Status: DISCONTINUED | OUTPATIENT
Start: 2022-12-29 | End: 2022-12-31 | Stop reason: HOSPADM

## 2022-12-29 RX ORDER — BUDESONIDE AND FORMOTEROL FUMARATE DIHYDRATE 160; 4.5 UG/1; UG/1
2 AEROSOL RESPIRATORY (INHALATION)
Status: DISCONTINUED | OUTPATIENT
Start: 2022-12-29 | End: 2022-12-31 | Stop reason: HOSPADM

## 2022-12-29 RX ORDER — HEPARIN SODIUM 5000 [USP'U]/ML
5000 INJECTION, SOLUTION INTRAVENOUS; SUBCUTANEOUS EVERY 12 HOURS SCHEDULED
Status: DISCONTINUED | OUTPATIENT
Start: 2022-12-30 | End: 2022-12-31 | Stop reason: HOSPADM

## 2022-12-29 RX ORDER — SODIUM CHLORIDE 0.9 % (FLUSH) 0.9 %
10 SYRINGE (ML) INJECTION EVERY 12 HOURS SCHEDULED
Status: DISCONTINUED | OUTPATIENT
Start: 2022-12-29 | End: 2022-12-31 | Stop reason: HOSPADM

## 2022-12-29 RX ORDER — IPRATROPIUM BROMIDE AND ALBUTEROL SULFATE 2.5; .5 MG/3ML; MG/3ML
3 SOLUTION RESPIRATORY (INHALATION) EVERY 4 HOURS PRN
Status: DISCONTINUED | OUTPATIENT
Start: 2022-12-29 | End: 2022-12-31 | Stop reason: HOSPADM

## 2022-12-29 RX ORDER — NICOTINE 21 MG/24HR
1 PATCH, TRANSDERMAL 24 HOURS TRANSDERMAL NIGHTLY
Status: DISCONTINUED | OUTPATIENT
Start: 2022-12-29 | End: 2022-12-31 | Stop reason: HOSPADM

## 2022-12-29 RX ORDER — CHOLECALCIFEROL (VITAMIN D3) 125 MCG
5 CAPSULE ORAL NIGHTLY PRN
Status: DISCONTINUED | OUTPATIENT
Start: 2022-12-29 | End: 2022-12-31 | Stop reason: HOSPADM

## 2022-12-29 RX ORDER — SODIUM CHLORIDE 0.9 % (FLUSH) 0.9 %
10 SYRINGE (ML) INJECTION AS NEEDED
Status: DISCONTINUED | OUTPATIENT
Start: 2022-12-29 | End: 2022-12-31 | Stop reason: HOSPADM

## 2022-12-29 RX ORDER — METHYLPREDNISOLONE SODIUM SUCCINATE 125 MG/2ML
125 INJECTION, POWDER, LYOPHILIZED, FOR SOLUTION INTRAMUSCULAR; INTRAVENOUS ONCE
Status: COMPLETED | OUTPATIENT
Start: 2022-12-29 | End: 2022-12-29

## 2022-12-29 RX ORDER — ACETAMINOPHEN 325 MG/1
650 TABLET ORAL EVERY 4 HOURS PRN
Status: DISCONTINUED | OUTPATIENT
Start: 2022-12-29 | End: 2022-12-31 | Stop reason: HOSPADM

## 2022-12-29 RX ORDER — SODIUM CHLORIDE 9 MG/ML
40 INJECTION, SOLUTION INTRAVENOUS AS NEEDED
Status: DISCONTINUED | OUTPATIENT
Start: 2022-12-29 | End: 2022-12-31 | Stop reason: HOSPADM

## 2022-12-29 RX ORDER — ACETAMINOPHEN 160 MG/5ML
650 SOLUTION ORAL EVERY 4 HOURS PRN
Status: DISCONTINUED | OUTPATIENT
Start: 2022-12-29 | End: 2022-12-31 | Stop reason: HOSPADM

## 2022-12-29 RX ORDER — GUAIFENESIN 600 MG/1
1200 TABLET, EXTENDED RELEASE ORAL EVERY 12 HOURS SCHEDULED
Status: DISCONTINUED | OUTPATIENT
Start: 2022-12-29 | End: 2022-12-31 | Stop reason: HOSPADM

## 2022-12-29 RX ORDER — IPRATROPIUM BROMIDE AND ALBUTEROL SULFATE 2.5; .5 MG/3ML; MG/3ML
3 SOLUTION RESPIRATORY (INHALATION) ONCE
Status: COMPLETED | OUTPATIENT
Start: 2022-12-29 | End: 2022-12-29

## 2022-12-29 RX ORDER — LEVOFLOXACIN 5 MG/ML
750 INJECTION, SOLUTION INTRAVENOUS EVERY 24 HOURS
Status: DISCONTINUED | OUTPATIENT
Start: 2022-12-30 | End: 2022-12-31 | Stop reason: HOSPADM

## 2022-12-29 RX ORDER — LEVOFLOXACIN 5 MG/ML
750 INJECTION, SOLUTION INTRAVENOUS ONCE
Status: COMPLETED | OUTPATIENT
Start: 2022-12-29 | End: 2022-12-29

## 2022-12-29 RX ORDER — ACETAMINOPHEN 650 MG/1
650 SUPPOSITORY RECTAL EVERY 4 HOURS PRN
Status: DISCONTINUED | OUTPATIENT
Start: 2022-12-29 | End: 2022-12-31 | Stop reason: HOSPADM

## 2022-12-29 RX ORDER — GUAIFENESIN/DEXTROMETHORPHAN 100-10MG/5
10 SYRUP ORAL EVERY 4 HOURS PRN
Status: DISCONTINUED | OUTPATIENT
Start: 2022-12-29 | End: 2022-12-31 | Stop reason: HOSPADM

## 2022-12-29 RX ORDER — NITROGLYCERIN 0.4 MG/1
0.4 TABLET SUBLINGUAL
Status: DISCONTINUED | OUTPATIENT
Start: 2022-12-29 | End: 2022-12-31 | Stop reason: HOSPADM

## 2022-12-29 RX ORDER — ONDANSETRON 4 MG/1
4 TABLET, FILM COATED ORAL EVERY 6 HOURS PRN
Status: DISCONTINUED | OUTPATIENT
Start: 2022-12-29 | End: 2022-12-31 | Stop reason: HOSPADM

## 2022-12-29 RX ADMIN — ONDANSETRON 4 MG: 2 INJECTION INTRAMUSCULAR; INTRAVENOUS at 23:08

## 2022-12-29 RX ADMIN — METHYLPREDNISOLONE SODIUM SUCCINATE 125 MG: 125 INJECTION, POWDER, FOR SOLUTION INTRAMUSCULAR; INTRAVENOUS at 21:39

## 2022-12-29 RX ADMIN — GUAIFENESIN 1200 MG: 600 TABLET, EXTENDED RELEASE ORAL at 23:05

## 2022-12-29 RX ADMIN — NICOTINE 1 PATCH: 21 PATCH, EXTENDED RELEASE TRANSDERMAL at 23:05

## 2022-12-29 RX ADMIN — LEVOFLOXACIN 750 MG: 5 INJECTION, SOLUTION INTRAVENOUS at 21:39

## 2022-12-29 RX ADMIN — IPRATROPIUM BROMIDE AND ALBUTEROL SULFATE 3 ML: .5; 3 SOLUTION RESPIRATORY (INHALATION) at 19:32

## 2022-12-29 NOTE — Clinical Note
Level of Care: Telemetry [5]   Admitting Physician: RONEY TAMAYO [[id number]]   Attending Physician: RONEY TAMAYO [[id number]]

## 2022-12-30 PROBLEM — J96.01 ACUTE RESPIRATORY FAILURE WITH HYPOXIA (HCC): Status: ACTIVE | Noted: 2022-12-30

## 2022-12-30 LAB
ANION GAP SERPL CALCULATED.3IONS-SCNC: 10 MMOL/L (ref 5–15)
BASOPHILS # BLD AUTO: 0 10*3/MM3 (ref 0–0.2)
BASOPHILS NFR BLD AUTO: 0.8 % (ref 0–1.5)
BUN SERPL-MCNC: 12 MG/DL (ref 8–23)
BUN/CREAT SERPL: 19 (ref 7–25)
CALCIUM SPEC-SCNC: 8.4 MG/DL (ref 8.6–10.5)
CHLORIDE SERPL-SCNC: 102 MMOL/L (ref 98–107)
CO2 SERPL-SCNC: 27 MMOL/L (ref 22–29)
CREAT SERPL-MCNC: 0.63 MG/DL (ref 0.57–1)
DEPRECATED RDW RBC AUTO: 40.3 FL (ref 37–54)
EGFRCR SERPLBLD CKD-EPI 2021: 99.8 ML/MIN/1.73
EOSINOPHIL # BLD AUTO: 0 10*3/MM3 (ref 0–0.4)
EOSINOPHIL NFR BLD AUTO: 0.1 % (ref 0.3–6.2)
ERYTHROCYTE [DISTWIDTH] IN BLOOD BY AUTOMATED COUNT: 13.8 % (ref 12.3–15.4)
GLUCOSE SERPL-MCNC: 159 MG/DL (ref 65–99)
HCT VFR BLD AUTO: 36.8 % (ref 34–46.6)
HGB BLD-MCNC: 12.1 G/DL (ref 12–15.9)
LYMPHOCYTES # BLD AUTO: 0.4 10*3/MM3 (ref 0.7–3.1)
LYMPHOCYTES NFR BLD AUTO: 16.7 % (ref 19.6–45.3)
MCH RBC QN AUTO: 27.5 PG (ref 26.6–33)
MCHC RBC AUTO-ENTMCNC: 32.7 G/DL (ref 31.5–35.7)
MCV RBC AUTO: 84 FL (ref 79–97)
MONOCYTES # BLD AUTO: 0.2 10*3/MM3 (ref 0.1–0.9)
MONOCYTES NFR BLD AUTO: 7.1 % (ref 5–12)
NEUTROPHILS NFR BLD AUTO: 1.9 10*3/MM3 (ref 1.7–7)
NEUTROPHILS NFR BLD AUTO: 75.3 % (ref 42.7–76)
NRBC BLD AUTO-RTO: 0.1 /100 WBC (ref 0–0.2)
PLATELET # BLD AUTO: 207 10*3/MM3 (ref 140–450)
PMV BLD AUTO: 7.6 FL (ref 6–12)
POTASSIUM SERPL-SCNC: 4 MMOL/L (ref 3.5–5.2)
QT INTERVAL: 375 MS
RBC # BLD AUTO: 4.39 10*6/MM3 (ref 3.77–5.28)
SODIUM SERPL-SCNC: 139 MMOL/L (ref 136–145)
WBC NRBC COR # BLD: 2.5 10*3/MM3 (ref 3.4–10.8)

## 2022-12-30 PROCEDURE — 80048 BASIC METABOLIC PNL TOTAL CA: CPT

## 2022-12-30 PROCEDURE — 94799 UNLISTED PULMONARY SVC/PX: CPT

## 2022-12-30 PROCEDURE — 85025 COMPLETE CBC W/AUTO DIFF WBC: CPT

## 2022-12-30 PROCEDURE — 25010000002 HEPARIN (PORCINE) PER 1000 UNITS

## 2022-12-30 PROCEDURE — G0378 HOSPITAL OBSERVATION PER HR: HCPCS

## 2022-12-30 PROCEDURE — 25010000002 LEVOFLOXACIN PER 250 MG

## 2022-12-30 PROCEDURE — 96372 THER/PROPH/DIAG INJ SC/IM: CPT

## 2022-12-30 PROCEDURE — 87205 SMEAR GRAM STAIN: CPT

## 2022-12-30 PROCEDURE — 87070 CULTURE OTHR SPECIMN AEROBIC: CPT

## 2022-12-30 PROCEDURE — 94761 N-INVAS EAR/PLS OXIMETRY MLT: CPT

## 2022-12-30 RX ADMIN — Medication 10 ML: at 15:37

## 2022-12-30 RX ADMIN — BUDESONIDE AND FORMOTEROL FUMARATE DIHYDRATE 2 PUFF: 160; 4.5 AEROSOL RESPIRATORY (INHALATION) at 07:37

## 2022-12-30 RX ADMIN — LEVOFLOXACIN 750 MG: 5 INJECTION, SOLUTION INTRAVENOUS at 20:00

## 2022-12-30 RX ADMIN — IPRATROPIUM BROMIDE AND ALBUTEROL SULFATE 3 ML: .5; 3 SOLUTION RESPIRATORY (INHALATION) at 13:55

## 2022-12-30 RX ADMIN — GUAIFENESIN 1200 MG: 600 TABLET, EXTENDED RELEASE ORAL at 09:41

## 2022-12-30 RX ADMIN — NICOTINE 1 PATCH: 21 PATCH, EXTENDED RELEASE TRANSDERMAL at 20:15

## 2022-12-30 RX ADMIN — BUDESONIDE AND FORMOTEROL FUMARATE DIHYDRATE 2 PUFF: 160; 4.5 AEROSOL RESPIRATORY (INHALATION) at 20:39

## 2022-12-30 RX ADMIN — HEPARIN SODIUM 5000 UNITS: 5000 INJECTION INTRAVENOUS; SUBCUTANEOUS at 09:40

## 2022-12-30 RX ADMIN — GUAIFENESIN 1200 MG: 600 TABLET, EXTENDED RELEASE ORAL at 20:14

## 2022-12-30 RX ADMIN — HEPARIN SODIUM 5000 UNITS: 5000 INJECTION INTRAVENOUS; SUBCUTANEOUS at 20:14

## 2022-12-30 RX ADMIN — IPRATROPIUM BROMIDE AND ALBUTEROL SULFATE 3 ML: .5; 3 SOLUTION RESPIRATORY (INHALATION) at 09:50

## 2022-12-30 RX ADMIN — Medication 10 ML: at 20:01

## 2022-12-30 NOTE — PROGRESS NOTES
[facility]     Progress Note    Patient Name: Elena Dangelo  : 1960  MRN: [id number]  Primary Care Physician:  Provider, No Known  Date of admission: 2022  Service date and time: 22 11:07 EST  Subjective   Subjective     Chief Complaint: + cough, SOB    HPI:  Patient Reports feeling better      Objective   Objective     Vitals:   Temp:  [98.3 °F (36.8 °C)-99.1 °F (37.3 °C)] 98.4 °F (36.9 °C)  Heart Rate:  [80-99] 80  Resp:  [16-22] 18  BP: (106-126)/(57-77) 119/67  Flow (L/min):  [2-3] 2  Physical Exam    Constitutional: Awake, alert   Eyes: PERRLA, sclerae anicteric, no conjunctival injection   HENT: NCAT, mucous membranes moist   Neck: Supple, no thyromegaly, no lymphadenopathy, trachea midline   Respiratory: + wheezing and rhonchi   Cardiovascular: RRR, no murmurs, rubs, or gallops, palpable pedal pulses bilaterally   Gastrointestinal: Positive bowel sounds, soft, nontender, nondistended   Musculoskeletal: No bilateral ankle edema, no clubbing or cyanosis to extremities   Psychiatric: Appropriate affect, cooperative   Neurologic: Oriented x 3, strength symmetric in all extremities, Cranial Nerves grossly intact to confrontation, speech clear   Skin: No rashes     Result Review    Result Review:  I have personally reviewed the results from the time of this admission to 2022 11:07 EST and agree with these findings:  [x]  Laboratory list / accordion  [x]  Microbiology  [x]  Radiology  [x]  EKG/Telemetry   [x]  Cardiology/Vascular   []  Pathology  []  Old records  []  Other:        Assessment & Plan   Assessment / Plan       Active Hospital Problems:  Active Hospital Problems    Diagnosis    • **Pneumonia due to infectious organism, unspecified laterality, unspecified part of lung    • Acute respiratory failure with hypoxia (HCC)    • COPD exacerbation (HCC)    • Tobacco abuse      Plan:    - cont IV abx, supplemental oxygen wean as tolerated  - IV steroids, duonebs, IS  - f/u  cultures  - smoking cessation, nicotine patch  - anti tussives  - trend labs    DVT prophylaxis:  Medical DVT prophylaxis orders are present.    CODE STATUS:   Code Status (Patient has no pulse and is not breathing): CPR (Attempt to Resuscitate)  Medical Interventions (Patient has pulse or is breathing): Full Support    Disposition:  I expect patient to be discharged 2-3 days    Jg Woodard MD

## 2022-12-30 NOTE — CASE MANAGEMENT/SOCIAL WORK
Discharge Planning Assessment   [place]     Patient Name: Elena Dangelo  MRN: [id number]  Today's Date: 12/30/2022    Admit Date: 12/29/2022    Plan: Home, New PCP appt on AVS, Watch for oxygen needs   Discharge Needs Assessment     Row Name 12/30/22 1246       Living Environment    People in Home spouse    Current Living Arrangements home    Primary Care Provided by self    Provides Primary Care For no one    Able to Return to Prior Arrangements yes       Resource/Environmental Concerns    Resource/Environmental Concerns none    Transportation Concerns none       Transition Planning    Patient/Family Anticipates Transition to home    Patient/Family Anticipated Services at Transition none       Discharge Needs Assessment    Equipment Currently Used at Home none    Concerns to be Addressed denies needs/concerns at this time    Anticipated Changes Related to Illness none    Equipment Needed After Discharge none               Discharge Plan     Row Name 12/30/22 1246       Plan    Plan Home, New PCP appt on AVS, Watch for oxygen needs    Patient/Family in Agreement with Plan yes    Plan Comments Met with Patient at bedside Lives at home with . IADL's PCP and Pharmacy verified, able to afford medications. D/C ABrriers: IV Steroids, Requiring oxygen              Continued Care and Services - Admitted Since 12/29/2022    Coordination has not been started for this encounter.       Expected Discharge Date and Time     Expected Discharge Date Expected Discharge Time    Dec 31, 2022          Demographic Summary     Row Name 12/30/22 1245       General Information    Admission Type observation    Arrived From emergency department    Required Notices Provided Observation Status Notice    Referral Source admission list    Preferred Language English               Functional Status     Row Name 12/30/22 1245       Functional Status    Usual Activity Tolerance moderate    Current Activity Tolerance moderate        Functional Status, IADL    Medications independent    Meal Preparation independent    Housekeeping independent    Laundry independent    Shopping independent       Mental Status    General Appearance WDL WDL       Mental Status Summary    Recent Changes in Mental Status/Cognitive Functioning no changes              Met with patient at bedside wearing mask and goggles, Spent less than 15 minutes in room at greater than 6 feet distance.              Radha Cabrera RN

## 2022-12-30 NOTE — ED PROVIDER NOTES
Subjective   History of Present Illness  Patient presents with:  Cough    Provider, No Known   No LMP recorded. Patient is postmenopausal.  Patient is a 62-year-old female with a history of COPD, not on home oxygen,, presents today to the ED with a complaint of shortness of breath.  Patient reports she was seen last Saturday here in the ED diagnosed with flu, she states that she began having flulike symptoms last Friday.  She completed a course of Tamiflu, today became very short of breath.  She reports that she has a productive cough which is new for her.  She has not had any lightheadedness or syncope.  No dizziness.  No abnormal leg pain or swelling.        Review of Systems   Constitutional: Negative for chills and fever.   Respiratory: Positive for cough and shortness of breath.    Cardiovascular: Negative for chest pain, palpitations and leg swelling.   Gastrointestinal: Negative for abdominal pain, diarrhea, nausea and vomiting.   Genitourinary: Negative for dysuria.   Musculoskeletal: Negative for back pain and neck pain.   Skin: Negative for color change and rash.   Neurological: Negative for dizziness, syncope, weakness and light-headedness.       Past Medical History:   Diagnosis Date   • COPD (chronic obstructive pulmonary disease) (HCC)        Allergies   Allergen Reactions   • Azithromycin Anaphylaxis   • Naproxen Anaphylaxis   • Penicillin G Swelling   • Penicillins Anaphylaxis and Swelling   • Codeine Nausea And Vomiting       Past Surgical History:   Procedure Laterality Date   • CHOLECYSTECTOMY     • TUBAL ABDOMINAL LIGATION         History reviewed. No pertinent family history.    Social History     Socioeconomic History   • Marital status:    Tobacco Use   • Smoking status: Every Day     Packs/day: 1.00     Types: Cigarettes   • Smokeless tobacco: Never   Substance and Sexual Activity   • Alcohol use: Never           Objective   Physical Exam  Vitals and nursing note reviewed.    Constitutional:       Appearance: Normal appearance. She is ill-appearing.   HENT:      Head: Normocephalic and atraumatic.      Nose: Nose normal.      Mouth/Throat:      Mouth: Mucous membranes are moist.      Pharynx: Oropharynx is clear.   Eyes:      Extraocular Movements: Extraocular movements intact.      Conjunctiva/sclera: Conjunctivae normal.      Pupils: Pupils are equal, round, and reactive to light.   Cardiovascular:      Rate and Rhythm: Normal rate and regular rhythm.      Heart sounds: No murmur heard.    No friction rub. No gallop.   Pulmonary:      Effort: Pulmonary effort is normal.      Breath sounds: Normal breath sounds.   Abdominal:      General: Bowel sounds are normal.      Palpations: Abdomen is soft.      Tenderness: There is no abdominal tenderness. There is no guarding or rebound.   Musculoskeletal:         General: Normal range of motion.      Cervical back: Normal range of motion and neck supple.   Skin:     General: Skin is warm and dry.      Capillary Refill: Capillary refill takes less than 2 seconds.   Neurological:      Mental Status: She is alert and oriented to person, place, and time.   Psychiatric:         Mood and Affect: Mood normal.         Behavior: Behavior normal.         Procedures           ED Course  ED Course as of 12/30/22 0125   Thu Dec 29, 2022   2045 Consult to hospitalist placed [LB]   2054 Discussed with MINOO Mata [LB]      ED Course User Index  [LB] Melonie Ricardo APRN      /68 (BP Location: Left arm, Patient Position: Sitting)   Pulse 86   Temp 98.4 °F (36.9 °C) (Oral)   Resp 16   Ht 151.1 cm (59.5\")   Wt 62.9 kg (138 lb 10.7 oz)   SpO2 (!) 82%   BMI 27.54 kg/m²   Labs Reviewed   COMPREHENSIVE METABOLIC PANEL - Abnormal; Notable for the following components:       Result Value    Glucose 102 (*)     Potassium 3.4 (*)     Calcium 8.5 (*)     All other components within normal limits    Narrative:     GFR Normal >60  Chronic Kidney  Disease <60  Kidney Failure <15     CBC WITH AUTO DIFFERENTIAL - Abnormal; Notable for the following components:    Monocyte % 13.3 (*)     Basophil % 1.7 (*)     All other components within normal limits   BLOOD GAS, ARTERIAL - Abnormal; Notable for the following components:    pO2, Arterial 161.8 (*)     HCO3, Arterial 28.2 (*)     Base Excess, Arterial 3.5 (*)     O2 Saturation, Arterial 99.5 (*)     CO2 Content 29.5 (*)     All other components within normal limits   BLOOD GAS, ARTERIAL - Abnormal; Notable for the following components:    pO2, Arterial 74.2 (*)     CO2 Content 29.1 (*)     All other components within normal limits   POC LACTATE - Abnormal; Notable for the following components:    Lactate 0.4 (*)     All other components within normal limits   RESPIRATORY PANEL PCR W/ COVID-19 (SARS-COV-2) NICK/ANISH/SYLVIA/PAD/COR/MAD/MIGUEL IN-HOUSE, NP SWAB IN UT/VTP, 3-4 HR TAT - Normal    Narrative:     In the setting of a positive respiratory panel with a viral infection PLUS a negative procalcitonin without other underlying concern for bacterial infection, consider observing off antibiotics or discontinuation of antibiotics and continue supportive care. If the respiratory panel is positive for atypical bacterial infection (Bordetella pertussis, Chlamydophila pneumoniae, or Mycoplasma pneumoniae), consider antibiotic de-escalation to target atypical bacterial infection.   LEGIONELLA ANTIGEN, URINE - Normal   STREP PNEUMO AG, URINE OR CSF - Normal   BNP (IN-HOUSE) - Normal    Narrative:     Among patients with dyspnea, NT-proBNP is highly sensitive for the detection of acute congestive heart failure. In addition NT-proBNP of <300 pg/ml effectively rules out acute congestive heart failure with 99% negative predictive value.     TROPONIN (IN-HOUSE) - Normal    Narrative:     Troponin T Reference Range:  <= 0.03 ng/mL-   Negative for AMI  >0.03 ng/mL-     Abnormal for myocardial necrosis.  Clinicians would have to  utilize clinical acumen, EKG, Troponin and serial changes to determine if it is an Acute Myocardial Infarction or myocardial injury due to an underlying chronic condition.       Results may be falsely decreased if patient taking Biotin.     PROCALCITONIN - Normal    Narrative:     As a Marker for Sepsis (Non-Neonates):    1. <0.5 ng/mL represents a low risk of severe sepsis and/or septic shock.  2. >2 ng/mL represents a high risk of severe sepsis and/or septic shock.    As a Marker for Lower Respiratory Tract Infections that require antibiotic therapy:    PCT on Admission    Antibiotic Therapy       6-12 Hrs later    >0.5                Strongly Recommended  >0.25 - <0.5        Recommended   0.1 - 0.25          Discouraged              Remeasure/reassess PCT  <0.1                Strongly Discouraged     Remeasure/reassess PCT    As 28 day mortality risk marker: \"Change in Procalcitonin Result\" (>80% or <=80%) if Day 0 (or Day 1) and Day 4 values are available. Refer to http://www.[facility]-pct-calculator.com    Change in PCT <=80%  A decrease of PCT levels below or equal to 80% defines a positive change in PCT test result representing a higher risk for 28-day all-cause mortality of patients diagnosed with severe sepsis for septic shock.    Change in PCT >80%  A decrease of PCT levels of more than 80% defines a negative change in PCT result representing a lower risk for 28-day all-cause mortality of patients diagnosed with severe sepsis or septic shock.      LACTIC ACID, PLASMA - Normal   BLOOD CULTURE   BLOOD CULTURE   RESPIRATORY CULTURE   RAINBOW DRAW    Narrative:     The following orders were created for panel order [place] Draw.  Procedure                               Abnormality         Status                     ---------                               -----------         ------                     Green Top (Gel)[[id number]]                                  Final result               Lavender Top[[id number]]                                      Final result               Gold Top - SST[[id number]]                                   Final result               Light Blue Top[[id number]]                                   Final result                 Please view results for these tests on the individual orders.   BLOOD GAS, ARTERIAL   BLOOD GAS, ARTERIAL   BASIC METABOLIC PANEL   CBC WITH AUTO DIFFERENTIAL   POC LACTATE   CBC AND DIFFERENTIAL    Narrative:     The following orders were created for panel order CBC & Differential.  Procedure                               Abnormality         Status                     ---------                               -----------         ------                     CBC Auto Differential[[id number]]        Abnormal            Final result                 Please view results for these tests on the individual orders.   GREEN TOP   LAVENDER TOP   GOLD TOP - SST   LIGHT BLUE TOP   CBC AND DIFFERENTIAL    Narrative:     The following orders were created for panel order CBC & Differential.  Procedure                               Abnormality         Status                     ---------                               -----------         ------                     CBC Auto Differential[[id number]]                                                         Please view results for these tests on the individual orders.     Medications   sodium chloride 0.9 % flush 10 mL (has no administration in time range)   guaiFENesin-dextromethorphan (ROBITUSSIN DM) 100-10 MG/5ML syrup 10 mL (has no administration in time range)   nitroglycerin (NITROSTAT) SL tablet 0.4 mg (has no administration in time range)   sodium chloride 0.9 % flush 10 mL (10 mL Intravenous Not Given 12/29/22 2358)   sodium chloride 0.9 % flush 10 mL (has no administration in time range)   sodium chloride 0.9 % infusion 40 mL (has no administration in time range)   acetaminophen (TYLENOL) tablet 650 mg (has no administration in time range)     Or    acetaminophen (TYLENOL) 160 MG/5ML solution 650 mg (has no administration in time range)     Or   acetaminophen (TYLENOL) suppository 650 mg (has no administration in time range)   ondansetron (ZOFRAN) tablet 4 mg (4 mg Oral Not Given 12/29/22 2311)     Or   ondansetron (ZOFRAN) injection 4 mg ( Intravenous Not Given:  See Alt 12/29/22 2311)   melatonin tablet 5 mg (has no administration in time range)   heparin (porcine) 5000 UNIT/ML injection 5,000 Units (has no administration in time range)   nicotine (NICODERM CQ) 21 MG/24HR patch 1 patch (1 patch Transdermal Medication Applied 12/29/22 2305)   ipratropium-albuterol (DUO-NEB) nebulizer solution 3 mL (has no administration in time range)   budesonide-formoterol (SYMBICORT) 160-4.5 MCG/ACT inhaler 2 puff (2 puffs Inhalation Not Given 12/29/22 2236)   levoFLOXacin (LEVAQUIN) 750 mg/150 mL D5W (premix) (LEVAQUIN) 750 mg (has no administration in time range)   guaiFENesin (MUCINEX) 12 hr tablet 1,200 mg (1,200 mg Oral Given 12/29/22 2305)   ipratropium-albuterol (DUO-NEB) nebulizer solution 3 mL (3 mL Nebulization Given 12/29/22 1932)   levoFLOXacin (LEVAQUIN) 750 mg/150 mL D5W (premix) (LEVAQUIN) 750 mg (0 mg Intravenous Stopped 12/29/22 2312)   methylPREDNISolone sodium succinate (SOLU-Medrol) injection 125 mg (125 mg Intravenous Given 12/29/22 2139)     XR Chest 1 View    Result Date: 12/29/2022  1. Mild increased patchy right basilar airspace disease which may relate to pneumonia or atelectasis. 2. Emphysema.  Electronically Signed By-Alfonso Casas MD On:12/29/2022 8:03 PM This report was finalized on [id number] by  Alfonso Casas MD.         EKG interpreted per ED physician, viewed by me.  Our findings are: Sinus rhythm rate 91, compared to previous 12/23/2022.                                Medical Decision Making  Chart review: Patient seen here in the ED 12/23/2022, diagnosed with flu, given Tamiflu.  Appropriate PPE worn during patient interactions.    Differentials: Pneumonia, COPD exacerbation  This is not an all inclusive list of diagnosis considered.   Patient was brought back to the emergency department room for evaluation and placed on appropriate monitoring.  Patient underwent the above exam and work-up, patient had IV established and blood work obtained.  Patient was noted to be hypoxic upon arrival to the ED, she was placed on oxygen at 4 L tolerating this well with O2 saturations 91%.  She was given breathing treatment, Solu-Medrol.  Retroviral panel was negative.  She was started on Levaquin for pneumonia given her allergies.  She will be admitted for further evaluation.  Disposition: I discussed with the patient their test results, work-up here in the emergency department, and need for admission and further evaluation. Patient is agreeable to the plan of care. At time of disposition patients VS are reviewed, and patient without acute distress.  Opportunity was provided for questions at the bedside, all questions and concerns were addressed.  Note Disclaimer: At [facility], we believe that sharing information builds trust and better relationships. You are receiving this note because you recently visited [facility]. It is possible you will see health information before a provider has talked with you about it. This kind of information can be easy to misunderstand. To help you fully understand what it means for your health, we urge you to discuss this note with your provider.  Note dictated utilizing Dragon Dictation.       Dyspnea, unspecified type: acute illness or injury  Hypoxia: acute illness or injury  Pneumonia due to infectious organism, unspecified laterality, unspecified part of lung: chronic illness or injury  Amount and/or Complexity of Data Reviewed  Labs: ordered.  Radiology: ordered.  ECG/medicine tests: ordered. Decision-making details documented in ED Course.      Risk  Prescription drug management.  Decision regarding  hospitalization.          Final diagnoses:   Pneumonia due to infectious organism, unspecified laterality, unspecified part of lung   Hypoxia   Dyspnea, unspecified type       ED Disposition  ED Disposition     ED Disposition   Decision to Admit    Condition   --    Comment   Level of Care: Telemetry [5]   Diagnosis: Pneumonia due to infectious organism, unspecified laterality, unspecified part of lung [[id number]]   Admitting Physician: RONEY TAMAYO [[id number]]   Attending Physician: RONEY TAMAYO [[id number]]               No follow-up provider specified.       Medication List      No changes were made to your prescriptions during this visit.          Melonie Ricardo, APRN  12/30/22 0125

## 2022-12-30 NOTE — PROGRESS NOTES
Patient Story (Not Part of Legal Medical Record)  Nursing report ED to floor  Elena Dangelo  62 y.o.  female    HPI:   Chief Complaint   Patient presents with    Cough       Admitting doctor:   Sommer Hadley DO    Admitting diagnosis:   The primary encounter diagnosis was Pneumonia due to infectious organism, unspecified laterality, unspecified part of lung. Diagnoses of Hypoxia and Dyspnea, unspecified type were also pertinent to this visit.    Code status:   Current Code Status       Date Active Code Status Order ID Comments User Context       12/29/2022 2225 CPR (Attempt to Resuscitate) [id number]  Esperanza Enciso, [facility]        Question Answer    Code Status (Patient has no pulse and is not breathing) CPR (Attempt to Resuscitate)    Medical Interventions (Patient has pulse or is breathing) Full Support                    Allergies:   Azithromycin, Naproxen, Penicillin g, Penicillins, and Codeine    Isolation:  No active isolations     Fall Risk:  Fall Risk Assessment was completed, and patient is at moderate risk for falls.   Predictive Model Details         15 (Low) Factor Value    Calculated 12/30/2022 14:08 Age 62    Risk of Fall Model Musculoskeletal Assessment WDL     Active Peripheral IV Present     Imaging order in this encounter Present     Number of Distinct Medication Classes administered 7     Number of administrations of Misc Psychotherapeutics 1     Drug Use Not Asked     Respiratory Rate 18     Skin Assessment WDL     Magnesium not on file     Calcium 8.4 mg/dL     Tobacco Use Current     Ge Scale not on file     Diastolic BP 73     Financial Class Private Insurance     Peripheral Vascular Assessment WDL     Albumin 3.7 g/dL     Days after Admission 0.801     Gastrointestinal Assessment WDL     Chloride 102 mmol/L     Cardiac Assessment WDL     Number of administrations of Anti-Coagulants 1     Creatinine 0.63 mg/dL     Potassium 4 mmol/L     Total Bilirubin 0.2 mg/dL     ALT 12 U/L          Weight:       12/29/22  1853   Weight: 62.9 kg (138 lb 10.7 oz)       Intake and Output    Intake/Output Summary (Last 24 hours) at 12/30/2022 1418  Last data filed at 12/29/2022 2312  Gross per 24 hour   Intake 150 ml   Output --   Net 150 ml       Diet:   Dietary Orders (From admission, onward)       Start     Ordered    12/29/22 2221  Diet: Regular/House Diet; Texture: Regular Texture (IDDSI 7); Fluid Consistency: Thin (IDDSI 0)  Diet Effective Now        References:    Diet Order [facility]   Question Answer Comment   Diets: Regular/House Diet    Texture: Regular Texture (IDDSI 7)    Fluid Consistency: Thin (IDDSI 0)        12/29/22 2221                     Most recent vitals:   Vitals:    12/30/22 1219 12/30/22 1234 12/30/22 1239 12/30/22 1244   BP: 117/66 132/69  127/73   BP Location:       Patient Position:       Pulse: 81 89  98   Resp:       Temp:       TempSrc:       SpO2: 90% 91% 90%    Weight:       Height:           Active LDAs/IV Access:   Lines, Drains & Airways       Active LDAs       Name Placement date Placement time Site Days    Peripheral IV 12/29/22 1932 Anterior;Right Wrist 12/29/22 1932  Wrist  less than 1                    Skin Condition:   Skin Assessments (last day)       None             Labs (abnormal labs have a star):   Labs Reviewed   COMPREHENSIVE METABOLIC PANEL - Abnormal; Notable for the following components:       Result Value    Glucose 102 (*)     Potassium 3.4 (*)     Calcium 8.5 (*)     All other components within normal limits    Narrative:     GFR Normal >60  Chronic Kidney Disease <60  Kidney Failure <15     CBC WITH AUTO DIFFERENTIAL - Abnormal; Notable for the following components:    Monocyte % 13.3 (*)     Basophil % 1.7 (*)     All other components within normal limits   BLOOD GAS, ARTERIAL - Abnormal; Notable for the following components:    pO2, Arterial 161.8 (*)     HCO3, Arterial 28.2 (*)     Base Excess, Arterial 3.5 (*)     O2 Saturation, Arterial 99.5 (*)      CO2 Content 29.5 (*)     All other components within normal limits   BLOOD GAS, ARTERIAL - Abnormal; Notable for the following components:    pO2, Arterial 74.2 (*)     CO2 Content 29.1 (*)     All other components within normal limits   BASIC METABOLIC PANEL - Abnormal; Notable for the following components:    Glucose 159 (*)     Calcium 8.4 (*)     All other components within normal limits    Narrative:     GFR Normal >60  Chronic Kidney Disease <60  Kidney Failure <15     CBC WITH AUTO DIFFERENTIAL - Abnormal; Notable for the following components:    WBC 2.50 (*)     Lymphocyte % 16.7 (*)     Eosinophil % 0.1 (*)     Lymphocytes, Absolute 0.40 (*)     All other components within normal limits   POC LACTATE - Abnormal; Notable for the following components:    Lactate 0.4 (*)     All other components within normal limits   RESPIRATORY PANEL PCR W/ COVID-19 (SARS-COV-2) NICK/ANISH/SYLVIA/PAD/COR/MAD/MIGUEL IN-HOUSE, NP SWAB IN UT/VTP, 3-4 HR TAT - Normal    Narrative:     In the setting of a positive respiratory panel with a viral infection PLUS a negative procalcitonin without other underlying concern for bacterial infection, consider observing off antibiotics or discontinuation of antibiotics and continue supportive care. If the respiratory panel is positive for atypical bacterial infection (Bordetella pertussis, Chlamydophila pneumoniae, or Mycoplasma pneumoniae), consider antibiotic de-escalation to target atypical bacterial infection.   LEGIONELLA ANTIGEN, URINE - Normal   STREP PNEUMO AG, URINE OR CSF - Normal   BNP (IN-HOUSE) - Normal    Narrative:     Among patients with dyspnea, NT-proBNP is highly sensitive for the detection of acute congestive heart failure. In addition NT-proBNP of <300 pg/ml effectively rules out acute congestive heart failure with 99% negative predictive value.     TROPONIN (IN-HOUSE) - Normal    Narrative:     Troponin T Reference Range:  <= 0.03 ng/mL-   Negative for AMI  >0.03 ng/mL-      Abnormal for myocardial necrosis.  Clinicians would have to utilize clinical acumen, EKG, Troponin and serial changes to determine if it is an Acute Myocardial Infarction or myocardial injury due to an underlying chronic condition.       Results may be falsely decreased if patient taking Biotin.     PROCALCITONIN - Normal    Narrative:     As a Marker for Sepsis (Non-Neonates):    1. <0.5 ng/mL represents a low risk of severe sepsis and/or septic shock.  2. >2 ng/mL represents a high risk of severe sepsis and/or septic shock.    As a Marker for Lower Respiratory Tract Infections that require antibiotic therapy:    PCT on Admission    Antibiotic Therapy       6-12 Hrs later    >0.5                Strongly Recommended  >0.25 - <0.5        Recommended   0.1 - 0.25          Discouraged              Remeasure/reassess PCT  <0.1                Strongly Discouraged     Remeasure/reassess PCT    As 28 day mortality risk marker: \"Change in Procalcitonin Result\" (>80% or <=80%) if Day 0 (or Day 1) and Day 4 values are available. Refer to [email or web address]    Change in PCT <=80%  A decrease of PCT levels below or equal to 80% defines a positive change in PCT test result representing a higher risk for 28-day all-cause mortality of patients diagnosed with severe sepsis for septic shock.    Change in PCT >80%  A decrease of PCT levels of more than 80% defines a negative change in PCT result representing a lower risk for 28-day all-cause mortality of patients diagnosed with severe sepsis or septic shock.      LACTIC ACID, PLASMA - Normal   RESPIRATORY CULTURE   BLOOD CULTURE   BLOOD CULTURE   RAINBOW DRAW    Narrative:     The following orders were created for panel order [place] Draw.  Procedure                               Abnormality         Status                     ---------                               -----------         ------                     Green Top (Gel)[[id number]]                                   Final result               Lavender Top[[id number]]                                     Final result               Gold Top - SST[[id number]]                                   Final result               Light Blue Top[[id number]]                                   Final result                 Please view results for these tests on the individual orders.   BLOOD GAS, ARTERIAL   BLOOD GAS, ARTERIAL   POC LACTATE   CBC AND DIFFERENTIAL    Narrative:     The following orders were created for panel order CBC & Differential.  Procedure                               Abnormality         Status                     ---------                               -----------         ------                     CBC Auto Differential[[id number]]        Abnormal            Final result                 Please view results for these tests on the individual orders.   GREEN TOP   LAVENDER TOP   GOLD TOP - SST   LIGHT BLUE TOP   CBC AND DIFFERENTIAL    Narrative:     The following orders were created for panel order CBC & Differential.  Procedure                               Abnormality         Status                     ---------                               -----------         ------                     CBC Auto Differential[[id number]]        Abnormal            Final result                 Please view results for these tests on the individual orders.       LOC: Person, Place, Time, and Situation    Telemetry:  Telemetry    Cardiac Monitoring Ordered: yes    EKG:   ECG 12 Lead Dyspnea   Final Result   HEART RATE= 91  bpm   RR Interval= 664  ms   SC Interval= 134  ms   P Horizontal Axis= 13  deg   P Front Axis= 85  deg   QRSD Interval= 92  ms   QT Interval= 375  ms   QRS Axis= 74  deg   T Wave Axis= 60  deg   - NORMAL ECG -   Sinus rhythm   When compared with ECG of 23-Dec-2022 9:19:06,   Significant rate decrease   Electronically Signed By: Panda Simpson) 30-Dec-2022 10:39:10   Date and Time of Study: 2022-12-29 19:27:03           Medications Given in the ED:   Medications   sodium chloride 0.9 % flush 10 mL (has no administration in time range)   guaiFENesin-dextromethorphan (ROBITUSSIN DM) 100-10 MG/5ML syrup 10 mL (has no administration in time range)   nitroglycerin (NITROSTAT) SL tablet 0.4 mg (has no administration in time range)   sodium chloride 0.9 % flush 10 mL (10 mL Intravenous Not Given 12/29/22 2251)   sodium chloride 0.9 % flush 10 mL (has no administration in time range)   sodium chloride 0.9 % infusion 40 mL (has no administration in time range)   acetaminophen (TYLENOL) tablet 650 mg (has no administration in time range)     Or   acetaminophen (TYLENOL) 160 MG/5ML solution 650 mg (has no administration in time range)     Or   acetaminophen (TYLENOL) suppository 650 mg (has no administration in time range)   ondansetron (ZOFRAN) tablet 4 mg (4 mg Oral Not Given 12/29/22 2311)     Or   ondansetron (ZOFRAN) injection 4 mg ( Intravenous Not Given:  See Alt 12/29/22 2311)   melatonin tablet 5 mg (has no administration in time range)   heparin (porcine) 5000 UNIT/ML injection 5,000 Units (5,000 Units Subcutaneous Given 12/30/22 0940)   nicotine (NICODERM CQ) 21 MG/24HR patch 1 patch (1 patch Transdermal Medication Applied 12/29/22 2305)   ipratropium-albuterol (DUO-NEB) nebulizer solution 3 mL (3 mL Nebulization Given 12/30/22 1355)   budesonide-formoterol (SYMBICORT) 160-4.5 MCG/ACT inhaler 2 puff (2 puffs Inhalation Given 12/30/22 0737)   levoFLOXacin (LEVAQUIN) 750 mg/150 mL D5W (premix) (LEVAQUIN) 750 mg (has no administration in time range)   guaiFENesin (MUCINEX) 12 hr tablet 1,200 mg (1,200 mg Oral Given 12/30/22 0941)   ipratropium-albuterol (DUO-NEB) nebulizer solution 3 mL (3 mL Nebulization Given 12/29/22 1932)   levoFLOXacin (LEVAQUIN) 750 mg/150 mL D5W (premix) (LEVAQUIN) 750 mg (0 mg Intravenous Stopped 12/29/22 2314)   methylPREDNISolone sodium succinate (SOLU-Medrol) injection 125 mg (125 mg Intravenous Given  12/29/22 2139)       Imaging results:  XR Chest 1 View    Result Date: 12/29/2022  1. Mild increased patchy right basilar airspace disease which may relate to pneumonia or atelectasis. 2. Emphysema.  Electronically Signed By-Alfonso Casas MD On:12/29/2022 8:03 PM This report was finalized on [id number] by  Alfonso Casas MD.     Social issues:   Social History     Socioeconomic History    Marital status:    Tobacco Use    Smoking status: Every Day     Packs/day: 1.00     Types: Cigarettes    Smokeless tobacco: Never   Substance and Sexual Activity    Alcohol use: Never       NIH Stroke Scale:  Interval: (not recorded)  1a. Level of Consciousness: (not recorded)  1b. LOC Questions: (not recorded)  1c. LOC Commands: (not recorded)  2. Best Gaze: (not recorded)  3. Visual: (not recorded)  4. Facial Palsy: (not recorded)  5a. Motor Arm, Left: (not recorded)  5b. Motor Arm, Right: (not recorded)  6a. Motor Leg, Left: (not recorded)  6b. Motor Leg, Right: (not recorded)  7. Limb Ataxia: (not recorded)  8. Sensory: (not recorded)  9. Best Language: (not recorded)  10. Dysarthria: (not recorded)  11. Extinction and Inattention (formerly Neglect): (not recorded)    Total (NIH Stroke Scale): (not recorded)     Additional notable assessment information:     Nursing report ED to floor:  Galilea Galan, VANIA   12/30/22 14:18 EST         Other (Not Part of Legal Medical Record)         ED to Floor Handoff (Not Part of Legal Medical Record)

## 2022-12-30 NOTE — PLAN OF CARE
Goal Outcome Evaluation:      Pt admitted this shift, c/o shortness of breath no distress noted. Will continue to monitor

## 2022-12-30 NOTE — H&P
[facility] Medicine Services  History & Physical    Patient Name: Elena Dangelo  : 1960  MRN: [id number]  Primary Care Physician:  Provider, No Known  Date of admission: 2022  Date and Time of Service: 2022 at 2200    Subjective      Chief Complaint: shortness of breath, sputum production, and cough    History of Present Illness: Elena Dangelo is a 62 y.o. female with a past medical history of COPD and tobacco abuse who presented to [facility] on 2022 complaining of shortness of breath.  She was previously seen in the [facility] ED on 2022 was diagnosed with influenza A.  She reports that she completed the course of Tamiflu but today she became very short of breath and also developed a productive cough.  She denies fevers, chills, abdominal pain, nausea, vomiting.    In the ED, respiratory panel was negative.  ABG showed pH 7.43, CO2 42.1, HCO3 28.2.  Troponin is negative, proBNP is 126.9.  Lactate, WBC, and procalcitonin are all within normal limits.  All other labs are unremarkable.  Blood cultures were collected and are pending.  Chest x-ray shows increased  patchy right basilar airspace disease which may relate to pneumonia and emphysema.  EKG shows sinus rhythm, HR 91.  On arrival her SPO2 was 82% on room which improved to  93% on O2 at 3 L per NC.  She is afebrile, all other vital signs are stable.  She was given Solu-Medrol, Levaquin (patient allergic to penicillin) and DuoNebs in the ED.  Hospitalist was consulted for further care and management.    Review of Systems   Constitutional: Negative.   HENT: Negative.    Eyes: Negative.    Cardiovascular: Negative.    Respiratory: Positive for cough, shortness of breath, sputum production and wheezing.    Hematologic/Lymphatic: Negative.    Skin: Negative.    Musculoskeletal: Negative.    Gastrointestinal: Negative.    Genitourinary: Negative.    Neurological: Negative.     Psychiatric/Behavioral: Negative.    Allergic/Immunologic: Negative.         Personal History     Past Medical History:   Diagnosis Date   • COPD (chronic obstructive pulmonary disease) ([facility])        Past Surgical History:   Procedure Laterality Date   • CHOLECYSTECTOMY     • TUBAL ABDOMINAL LIGATION         Family History: family history is not on file. Otherwise pertinent FHx was reviewed and not pertinent to current issue.    Social History:  reports that she has been smoking cigarettes. She has been smoking an average of 1 pack per day. She has never used smokeless tobacco. She reports that she does not drink alcohol.    Home Medications:  Prior to Admission Medications     Prescriptions Last Dose Informant Patient Reported? Taking?    albuterol sulfate  (90 Base) MCG/ACT inhaler   No No    Inhale 2 puffs Every 4 (Four) Hours As Needed for Wheezing or Shortness of Air.    benzonatate (TESSALON) 200 MG capsule   No No    Take 1 capsule by mouth 3 (Three) Times a Day As Needed for Cough.    brompheniramine-pseudoephedrine-DM 30-2-10 MG/5ML syrup   No No    Take 5 mL by mouth 4 (Four) Times a Day As Needed for Congestion or Cough.    Fluticasone-Umeclidin-Vilant (Trelegy Ellipta) 200-62.5-25 MCG/INH inhaler   No No    Inhale 1 puff Daily.    ipratropium-albuterol (DUO-NEB) 0.5-2.5 mg/3 ml nebulizer   No No    Take 3 mL by nebulization Every 4 (Four) Hours As Needed for Wheezing.    oseltamivir (TAMIFLU) 75 MG capsule   No No    Take 1 capsule by mouth 2 (Two) Times a Day.        Allergies:  Allergies   Allergen Reactions   • Azithromycin Anaphylaxis   • Naproxen Anaphylaxis   • Penicillin G Swelling   • Penicillins Anaphylaxis and Swelling   • Codeine Nausea And Vomiting       Objective      Vitals:   Temp:  [98.3 °F (36.8 °C)-99.1 °F (37.3 °C)] 98.4 °F (36.9 °C)  Heart Rate:  [86-99] 86  Resp:  [16-22] 16  BP: (116-121)/(58-68) 116/68    Physical Exam  Vitals and nursing note reviewed.   Constitutional:        Appearance: Normal appearance.      Interventions: Nasal cannula in place.   HENT:      Head: Normocephalic and atraumatic.      Nose: Nose normal.      Mouth/Throat:      Mouth: Mucous membranes are moist.   Eyes:      Extraocular Movements: Extraocular movements intact.      Pupils: Pupils are equal, round, and reactive to light.   Cardiovascular:      Rate and Rhythm: Normal rate and regular rhythm.      Pulses: Normal pulses.      Heart sounds: Normal heart sounds.   Pulmonary:      Effort: Pulmonary effort is normal.      Breath sounds: Examination of the right-upper field reveals wheezing. Examination of the left-upper field reveals wheezing. Examination of the right-lower field reveals rhonchi. Examination of the left-lower field reveals rhonchi. Wheezing and rhonchi present.      Comments: O2 @ 3L per NC  Abdominal:      General: Bowel sounds are normal.      Palpations: Abdomen is soft.   Musculoskeletal:         General: Normal range of motion.      Cervical back: Normal range of motion.   Skin:     General: Skin is warm and dry.   Neurological:      General: No focal deficit present.      Mental Status: She is alert and oriented to person, place, and time. Mental status is at baseline.   Psychiatric:         Mood and Affect: Mood normal.         Behavior: Behavior normal.         Result Review    Result Review:  I have personally reviewed the results from the time of this admission to 12/29/2022 22:39 EST and agree with these findings:  [x]  Laboratory  [x]  Microbiology  [x]  Radiology  []  EKG/Telemetry   []  Cardiology/Vascular   []  Pathology  []  Old records  []  Other:  Most notable findings include: as above    Assessment & Plan        Active Hospital Problems:  Active Hospital Problems    Diagnosis    • **Pneumonia due to infectious organism, unspecified laterality, unspecified part of lung    • COPD exacerbation (HCC)    • Tobacco abuse      Plan:     Pneumonia  COPD Exacerbation  Tobacco  Abuse  -Xray of the chest reviewed   -ABG reviewed  -COVID-negative  -WBC 4.2  -Procalcitonin 0.5  -Blood cultures pending  -Urine Legionella ordered  -Urine strep pneumo ordered  -Respiratory culture ordered  -DuoNebs ordered as needed  -Levaquin given in ED due to patient allergies, continue  -Solumedrol ordered  -Supplemental oxygen to keep sats greater than 92%  -Incentive spirometry  -Cough and deep breathe  -Encourage tobacco cessation  -Nicotine patch ordered      DVT prophylaxis:  Medical DVT prophylaxis orders are present.    CODE STATUS:    Code Status (Patient has no pulse and is not breathing): CPR (Attempt to Resuscitate)  Medical Interventions (Patient has pulse or is breathing): Full Support    Admission Status:  I believe this patient meets inpatient status.    I discussed the patient's findings and my recommendations with patient and family.    This patient has been examined wearing appropriate Personal Protective Equipment  12/29/22      Signature: Electronically signed by Esperanza Enciso DNP, APRN, 12/29/22, 22:39 EST.  [facility] Hospitalist Team

## 2022-12-31 ENCOUNTER — READMISSION MANAGEMENT (OUTPATIENT)
Dept: CALL CENTER | Facility: HOSPITAL | Age: 62
End: 2022-12-31

## 2022-12-31 VITALS
HEIGHT: 60 IN | DIASTOLIC BLOOD PRESSURE: 62 MMHG | HEART RATE: 70 BPM | OXYGEN SATURATION: 97 % | TEMPERATURE: 98.3 F | WEIGHT: 136.02 LBS | SYSTOLIC BLOOD PRESSURE: 112 MMHG | BODY MASS INDEX: 26.71 KG/M2 | RESPIRATION RATE: 18 BRPM

## 2022-12-31 LAB
ANION GAP SERPL CALCULATED.3IONS-SCNC: 10 MMOL/L (ref 5–15)
BASOPHILS # BLD AUTO: 0 10*3/MM3 (ref 0–0.2)
BASOPHILS NFR BLD AUTO: 0.5 % (ref 0–1.5)
BUN SERPL-MCNC: 17 MG/DL (ref 8–23)
BUN/CREAT SERPL: 20 (ref 7–25)
CALCIUM SPEC-SCNC: 8.4 MG/DL (ref 8.6–10.5)
CHLORIDE SERPL-SCNC: 104 MMOL/L (ref 98–107)
CO2 SERPL-SCNC: 27 MMOL/L (ref 22–29)
CREAT SERPL-MCNC: 0.85 MG/DL (ref 0.57–1)
DEPRECATED RDW RBC AUTO: 40.7 FL (ref 37–54)
EGFRCR SERPLBLD CKD-EPI 2021: 77.1 ML/MIN/1.73
EOSINOPHIL # BLD AUTO: 0 10*3/MM3 (ref 0–0.4)
EOSINOPHIL NFR BLD AUTO: 0.1 % (ref 0.3–6.2)
ERYTHROCYTE [DISTWIDTH] IN BLOOD BY AUTOMATED COUNT: 13.8 % (ref 12.3–15.4)
GLUCOSE SERPL-MCNC: 114 MG/DL (ref 65–99)
HCT VFR BLD AUTO: 37.4 % (ref 34–46.6)
HGB BLD-MCNC: 12 G/DL (ref 12–15.9)
LYMPHOCYTES # BLD AUTO: 1.4 10*3/MM3 (ref 0.7–3.1)
LYMPHOCYTES NFR BLD AUTO: 33.2 % (ref 19.6–45.3)
MCH RBC QN AUTO: 27.3 PG (ref 26.6–33)
MCHC RBC AUTO-ENTMCNC: 32.2 G/DL (ref 31.5–35.7)
MCV RBC AUTO: 84.9 FL (ref 79–97)
MONOCYTES # BLD AUTO: 0.5 10*3/MM3 (ref 0.1–0.9)
MONOCYTES NFR BLD AUTO: 12.5 % (ref 5–12)
NEUTROPHILS NFR BLD AUTO: 2.2 10*3/MM3 (ref 1.7–7)
NEUTROPHILS NFR BLD AUTO: 53.7 % (ref 42.7–76)
NRBC BLD AUTO-RTO: 0 /100 WBC (ref 0–0.2)
PLATELET # BLD AUTO: 221 10*3/MM3 (ref 140–450)
PMV BLD AUTO: 7.5 FL (ref 6–12)
POTASSIUM SERPL-SCNC: 3.8 MMOL/L (ref 3.5–5.2)
RBC # BLD AUTO: 4.4 10*6/MM3 (ref 3.77–5.28)
SODIUM SERPL-SCNC: 141 MMOL/L (ref 136–145)
WBC NRBC COR # BLD: 4.2 10*3/MM3 (ref 3.4–10.8)

## 2022-12-31 PROCEDURE — G0378 HOSPITAL OBSERVATION PER HR: HCPCS

## 2022-12-31 PROCEDURE — 85025 COMPLETE CBC W/AUTO DIFF WBC: CPT

## 2022-12-31 PROCEDURE — 80048 BASIC METABOLIC PNL TOTAL CA: CPT

## 2022-12-31 PROCEDURE — 96372 THER/PROPH/DIAG INJ SC/IM: CPT

## 2022-12-31 PROCEDURE — 25010000002 HEPARIN (PORCINE) PER 1000 UNITS

## 2022-12-31 PROCEDURE — 94618 PULMONARY STRESS TESTING: CPT

## 2022-12-31 PROCEDURE — 94761 N-INVAS EAR/PLS OXIMETRY MLT: CPT

## 2022-12-31 PROCEDURE — 94664 DEMO&/EVAL PT USE INHALER: CPT

## 2022-12-31 PROCEDURE — 94799 UNLISTED PULMONARY SVC/PX: CPT

## 2022-12-31 PROCEDURE — 36415 COLL VENOUS BLD VENIPUNCTURE: CPT

## 2022-12-31 RX ORDER — IPRATROPIUM BROMIDE AND ALBUTEROL SULFATE 2.5; .5 MG/3ML; MG/3ML
3 SOLUTION RESPIRATORY (INHALATION) EVERY 4 HOURS PRN
Qty: 90 ML | Refills: 0 | Status: SHIPPED | OUTPATIENT
Start: 2022-12-31 | End: 2023-01-04 | Stop reason: SDUPTHER

## 2022-12-31 RX ORDER — LEVOFLOXACIN 750 MG/1
750 TABLET ORAL DAILY
Qty: 5 TABLET | Refills: 0 | Status: SHIPPED | OUTPATIENT
Start: 2022-12-31 | End: 2023-03-07

## 2022-12-31 RX ADMIN — Medication 10 ML: at 08:03

## 2022-12-31 RX ADMIN — HEPARIN SODIUM 5000 UNITS: 5000 INJECTION INTRAVENOUS; SUBCUTANEOUS at 08:03

## 2022-12-31 RX ADMIN — GUAIFENESIN 1200 MG: 600 TABLET, EXTENDED RELEASE ORAL at 08:03

## 2022-12-31 RX ADMIN — IPRATROPIUM BROMIDE AND ALBUTEROL SULFATE 3 ML: .5; 3 SOLUTION RESPIRATORY (INHALATION) at 02:03

## 2022-12-31 RX ADMIN — IPRATROPIUM BROMIDE AND ALBUTEROL SULFATE 3 ML: .5; 3 SOLUTION RESPIRATORY (INHALATION) at 10:01

## 2022-12-31 RX ADMIN — BUDESONIDE AND FORMOTEROL FUMARATE DIHYDRATE 2 PUFF: 160; 4.5 AEROSOL RESPIRATORY (INHALATION) at 06:10

## 2022-12-31 NOTE — DISCHARGE SUMMARY
[facility] Medicine Services  DISCHARGE SUMMARY    Patient Name: Elena Dangelo  : 1960  MRN: [id number]    Date of Admission: 2022  Discharge Diagnosis:   Date of Discharge:  2022  Primary Care Physician: Provider, No Known      Presenting Problem:   Hypoxia [R09.02]  Dyspnea, unspecified type [R06.00]  Pneumonia due to infectious organism, unspecified laterality, unspecified part of lung [J18.9]    Active and Resolved Hospital Problems:  Active Hospital Problems    Diagnosis POA   • **Pneumonia due to infectious organism, unspecified laterality, unspecified part of lung [J18.9] Yes   • Acute respiratory failure with hypoxia (HCC) [J96.01] Yes   • COPD exacerbation (HCC) [J44.1] Yes   • Tobacco abuse [Z72.0] Yes      Resolved Hospital Problems   No resolved problems to display.         Hospital Course     Hospital Course      62 year old female admitted with active complaint of not feeling well. Pt recognized to have pneumonia involving the right side, pt treated with iv levaquin changed to oral levaquin on discharge for few more days. Pt recently diagnosed diagnosed with influenza. Pt required oxygen initially, will be getting 6 minutes walk to determine the need for home oxygen.     Condition on discharge stable.      DISCHARGE Follow Up with PCP in a week time.        Reasons For Change In Medications and Indications for New Medications:      Day of Discharge     Vital Signs:  Temp:  [97.7 °F (36.5 °C)-98.4 °F (36.9 °C)] 98.3 °F (36.8 °C)  Heart Rate:  [70-84] 70  Resp:  [16-18] 18  BP: (112-123)/(62-71) 112/62  Flow (L/min):  [2] 2    Physical Exam:  Physical Exam  Vitals and nursing note reviewed.   Constitutional:       General: She is not in acute distress.     Appearance: Normal appearance. She is well-developed. She is not ill-appearing, toxic-appearing or diaphoretic.   HENT:      Head: Normocephalic and atraumatic.      Right Ear: Ear canal and  external ear normal.      Left Ear: Ear canal and external ear normal.      Nose: Nose normal. No congestion or rhinorrhea.      Mouth/Throat:      Mouth: Mucous membranes are moist.      Pharynx: No oropharyngeal exudate.   Eyes:      General: No scleral icterus.        Right eye: No discharge.         Left eye: No discharge.      Extraocular Movements: Extraocular movements intact.      Conjunctiva/sclera: Conjunctivae normal.      Pupils: Pupils are equal, round, and reactive to light.   Neck:      Thyroid: No thyromegaly.      Vascular: No carotid bruit or JVD.      Trachea: No tracheal deviation.   Cardiovascular:      Rate and Rhythm: Normal rate and regular rhythm.      Pulses: Normal pulses.      Heart sounds: Normal heart sounds. No murmur heard.    No friction rub. No gallop.   Pulmonary:      Effort: Pulmonary effort is normal. No respiratory distress.      Breath sounds: Normal breath sounds. No stridor. No wheezing, rhonchi or rales.   Chest:      Chest wall: No tenderness.   Abdominal:      General: Bowel sounds are normal. There is no distension.      Palpations: Abdomen is soft. There is no mass.      Tenderness: There is no abdominal tenderness. There is no guarding or rebound.      Hernia: No hernia is present.   Musculoskeletal:         General: No swelling, tenderness, deformity or signs of injury. Normal range of motion.      Cervical back: Normal range of motion and neck supple. No rigidity. No muscular tenderness.      Right lower leg: No edema.      Left lower leg: No edema.   Lymphadenopathy:      Cervical: No cervical adenopathy.   Skin:     General: Skin is warm and dry.      Coloration: Skin is not jaundiced or pale.      Findings: No bruising, erythema or rash.   Neurological:      General: No focal deficit present.      Mental Status: She is alert and oriented to person, place, and time. Mental status is at baseline.      Cranial Nerves: No cranial nerve deficit.      Sensory: No  sensory deficit.      Motor: No weakness or abnormal muscle tone.      Coordination: Coordination normal.   Psychiatric:         Mood and Affect: Mood normal.         Behavior: Behavior normal.         Thought Content: Thought content normal.         Judgment: Judgment normal.              Pertinent  and/or Most Recent Results     LAB RESULTS:      Lab 12/31/22  0254 12/30/22  0357 12/29/22 2150 12/29/22 1938 12/29/22 1918   WBC 4.20 2.50*  --   --  4.20   HEMOGLOBIN 12.0 12.1  --   --  12.3   HEMATOCRIT 37.4 36.8  --   --  37.9   PLATELETS 221 207  --   --  230   NEUTROS ABS 2.20 1.90  --   --  2.00   LYMPHS ABS 1.40 0.40*  --   --  1.60   MONOS ABS 0.50 0.20  --   --  0.60   EOS ABS 0.00 0.00  --   --  0.00   MCV 84.9 84.0  --   --  84.8   PROCALCITONIN  --   --   --   --  0.02   LACTATE  --   --  0.5 0.4*  --          Lab 12/31/22  0254 12/30/22 0357 12/29/22 1918   SODIUM 141 139 141   POTASSIUM 3.8 4.0 3.4*   CHLORIDE 104 102 102   CO2 27.0 27.0 29.0   ANION GAP 10.0 10.0 10.0   BUN 17 12 10   CREATININE 0.85 0.63 0.69   EGFR 77.1 99.8 97.7   GLUCOSE 114* 159* 102*   CALCIUM 8.4* 8.4* 8.5*         Lab 12/29/22 1918   TOTAL PROTEIN 6.4   ALBUMIN 3.7   GLOBULIN 2.7   ALT (SGPT) 12   AST (SGOT) 22   BILIRUBIN 0.2   ALK PHOS 99         Lab 12/29/22 1918   PROBNP 126.9   TROPONIN T <0.010                 Lab 12/29/22 2141 12/29/22 1929   PH, ARTERIAL 7.406 7.433   PCO2, ARTERIAL 44.1 42.1   PO2 ART 74.2* 161.8*   O2 SATURATION ART 94.7 99.5*   FIO2 32 40   HCO3 ART 27.7 28.2*   BASE EXCESS ART 2.5 3.5*     Brief Urine Lab Results     None        Microbiology Results (last 10 days)     Procedure Component Value - Date/Time    Respiratory Culture - Sputum, Cough [[id number]] Collected: 12/30/22 0358    Lab Status: Preliminary result Specimen: Sputum from Cough Updated: 12/31/22 1050     Respiratory Culture Scant growth (1+) The culture consists of normal respiratory varun. This is a preliminary report; final  report to follow.     Gram Stain Moderate (3+) WBCs per low power field      Few (2+) Epithelial cells per low power field      Few (2+) Mixed bacterial morphotypes seen on Gram Stain    Legionella Antigen, Urine - Urine, Urine, Clean Catch [[id number]]  (Normal) Collected: 12/29/22 2314    Lab Status: Final result Specimen: Urine, Clean Catch Updated: 12/29/22 2339     LEGIONELLA ANTIGEN, URINE Negative    S. Pneumo Ag Urine or CSF - Urine, Urine, Clean Catch [[id number]]  (Normal) Collected: 12/29/22 2314    Lab Status: Final result Specimen: Urine, Clean Catch Updated: 12/29/22 2339     Strep Pneumo Ag Negative    Blood Culture - Blood, Wrist, Right [[id number]]  (Normal) Collected: 12/29/22 1934    Lab Status: Preliminary result Specimen: Blood from Wrist, Right Updated: 12/30/22 1946     Blood Culture No growth at 24 hours    Respiratory Panel PCR w/COVID-19(SARS-CoV-2) NICK/ANISH/SYLVIA/PAD/COR/MAD/MIGUEL In-House, NP Swab in UTM/VTM, 3-4 HR TAT - Swab, Nasopharynx [[id number]]  (Normal) Collected: 12/29/22 1934    Lab Status: Final result Specimen: Swab from Nasopharynx Updated: 12/29/22 2027     ADENOVIRUS, PCR Not Detected     Coronavirus 229E Not Detected     Coronavirus HKU1 Not Detected     Coronavirus NL63 Not Detected     Coronavirus OC43 Not Detected     COVID19 Not Detected     Human Metapneumovirus Not Detected     Human Rhinovirus/Enterovirus Not Detected     Influenza A PCR Not Detected     Influenza B PCR Not Detected     Parainfluenza Virus 1 Not Detected     Parainfluenza Virus 2 Not Detected     Parainfluenza Virus 3 Not Detected     Parainfluenza Virus 4 Not Detected     RSV, PCR Not Detected     Bordetella pertussis pcr Not Detected     Bordetella parapertussis PCR Not Detected     Chlamydophila pneumoniae PCR Not Detected     Mycoplasma pneumo by PCR Not Detected    Narrative:      In the setting of a positive respiratory panel with a viral infection PLUS a negative procalcitonin without other  underlying concern for bacterial infection, consider observing off antibiotics or discontinuation of antibiotics and continue supportive care. If the respiratory panel is positive for atypical bacterial infection (Bordetella pertussis, Chlamydophila pneumoniae, or Mycoplasma pneumoniae), consider antibiotic de-escalation to target atypical bacterial infection.    Blood Culture - Blood, Arm, Left [[id number]]  (Normal) Collected: 12/29/22 1918    Lab Status: Preliminary result Specimen: Blood from Arm, Left Updated: 12/30/22 1932     Blood Culture No growth at 24 hours    Respiratory Panel PCR w/COVID-19(SARS-CoV-2) NICK/ANISH/SYLVIA/PAD/COR/MAD/MIGUEL In-House, NP Swab in UTM/VTM, 3-4 HR TAT - Swab, Nasopharynx [[id number]]  (Abnormal) Collected: 12/23/22 0947    Lab Status: Final result Specimen: Swab from Nasopharynx Updated: 12/23/22 1121     ADENOVIRUS, PCR Not Detected     Coronavirus 229E Not Detected     Coronavirus HKU1 Not Detected     Coronavirus NL63 Not Detected     Coronavirus OC43 Not Detected     COVID19 Not Detected     Human Metapneumovirus Not Detected     Human Rhinovirus/Enterovirus Not Detected     Influenza A PCR Detected     Influenza B PCR Not Detected     Parainfluenza Virus 1 Not Detected     Parainfluenza Virus 2 Not Detected     Parainfluenza Virus 3 Not Detected     Parainfluenza Virus 4 Not Detected     RSV, PCR Not Detected     Bordetella pertussis pcr Not Detected     Bordetella parapertussis PCR Not Detected     Chlamydophila pneumoniae PCR Not Detected     Mycoplasma pneumo by PCR Not Detected    Narrative:      In the setting of a positive respiratory panel with a viral infection PLUS a negative procalcitonin without other underlying concern for bacterial infection, consider observing off antibiotics or discontinuation of antibiotics and continue supportive care. If the respiratory panel is positive for atypical bacterial infection (Bordetella pertussis, Chlamydophila pneumoniae, or  Mycoplasma pneumoniae), consider antibiotic de-escalation to target atypical bacterial infection.          XR Chest 1 View    Result Date: 12/29/2022  Impression: 1. Mild increased patchy right basilar airspace disease which may relate to pneumonia or atelectasis. 2. Emphysema.  Electronically Signed By-Alfonso Casas MD On:12/29/2022 8:03 PM This report was finalized on [id number] by  Alfonso Casas MD.    XR Chest 1 View    Result Date: 12/23/2022  Impression: Impression: Advanced emphysema. No acute chest finding. Electronically Signed: Kelsi Adkins  12/23/2022 10:08 AM EST  Workstation ID: [id number]                  Labs Pending at Discharge:  Pending Labs     Order Current Status    Blood Culture - Blood, Arm, Left Preliminary result    Blood Culture - Blood, Wrist, Right Preliminary result    Respiratory Culture - Sputum, Cough Preliminary result          Procedures Performed           Consults:   Consults     No orders found from 11/30/2022 to 12/30/2022.            Discharge Details        Discharge Medications      New Medications      Instructions Start Date   levoFLOXacin 750 MG tablet  Commonly known as: Levaquin   750 mg, Oral, Daily         Continue These Medications      Instructions Start Date   albuterol sulfate  (90 Base) MCG/ACT inhaler  Commonly known as: PROVENTIL HFA;VENTOLIN HFA;PROAIR HFA   2 puffs, Inhalation, Every 4 Hours PRN      benzonatate 200 MG capsule  Commonly known as: TESSALON   200 mg, Oral, 3 Times Daily PRN      brompheniramine-pseudoephedrine-DM 30-2-10 MG/5ML syrup   5 mL, Oral, 4 Times Daily PRN      ipratropium-albuterol 0.5-2.5 mg/3 ml nebulizer  Commonly known as: DUO-NEB   3 mL, Nebulization, Every 4 Hours PRN      Trelegy Ellipta 200-62.5-25 MCG/ACT aerosol powder   Generic drug: Fluticasone-Umeclidin-Vilant   1 puff, Inhalation, Daily - RT             Allergies   Allergen Reactions   • Azithromycin Anaphylaxis   • Naproxen Anaphylaxis   • Penicillin G Swelling    • Penicillins Anaphylaxis and Swelling   • Codeine Nausea And Vomiting         Discharge Disposition:   Home or Self Care    Diet:  Hospital:  Diet Order   Procedures   • Diet: Regular/House Diet; Texture: Regular Texture (IDDSI 7); Fluid Consistency: Thin (IDDSI 0)         Discharge Activity:         CODE STATUS:  Code Status and Medical Interventions:   Ordered at: 12/29/22 2225     Code Status (Patient has no pulse and is not breathing):    CPR (Attempt to Resuscitate)     Medical Interventions (Patient has pulse or is breathing):    Full Support         Future Appointments   Date Time Provider Department Center   1/4/2023  2:30 PM Gabby Barrios APRN [facility]       Additional Instructions for the Follow-ups that You Need to Schedule     Discharge Follow-up with PCP   As directed       Currently Documented PCP:    Provider, No Known    PCP Phone Number:    None     Follow Up Details: one week time.               Time spent on Discharge including face to face service 32 minutes    This patient has been examined wearing appropriate Personal Protective Equipment and discussed with hospital infection control department. 12/31/22      Signature:

## 2022-12-31 NOTE — PROGRESS NOTES
Exercise Oximetry    Patient Name:Elena Dangelo   MRN: [id number]   Date: 12/31/22             ROOM AIR BASELINE   SpO2% 92% room air   Heart Rate    Blood Pressure      EXERCISE ON ROOM AIR SpO2% EXERCISE ON O2 @ 3 LPM SpO2%   1 MINUTE 92 1 MINUTE    2 MINUTES 92 2 MINUTES    3 MINUTES 87 3 MINUTES 93   4 MINUTES  4 MINUTES 93   5 MINUTES  5 MINUTES 93   6 MINUTES  6 MINUTES 94              Distance Walked   Distance Walked   Dyspnea (Ab Scale)   Dyspnea (Ab Scale)   Fatigue (Ab Scale)   Fatigue (Ab Scale)   SpO2% Post Exercise   SpO2% Post Exercise 95% 3L NC   HR Post Exercise   HR Post Exercise   Time to Recovery   Time to Recovery     Comments: Patient sats 95% on 3L nasal cannula.

## 2022-12-31 NOTE — DISCHARGE PLACEMENT REQUEST
Maria InesgokulElena olvera (62 y.o. Female)     Date of Birth   1960    Social Security Number       Address   [address] [place] [address] [place] [address]    Home Phone   [phone]    MRN   [id number]       Religious   Anglican    Marital Status                               Admission Date   12/29/22    Admission Type   Emergency    Admitting Provider   Sommer Hadley DO    Attending Provider   Jg Woodard MD    Department, Room/Bed   [facility] 2C MEDICAL INPATIENT, 244/1       Discharge Date       Discharge Disposition   Home or Self Care    Discharge Destination                               Attending Provider: Jg Woodard MD    Allergies: Azithromycin, Naproxen, Penicillin G, Penicillins, Codeine    Isolation: None   Infection: None   Code Status: CPR    Ht: 151.1 cm (59.5\")   Wt: 61.7 kg (136 lb 0.4 oz)    Admission Cmt: None   Principal Problem: Pneumonia due to infectious organism, unspecified laterality, unspecified part of lung [J18.9]                 Active Insurance as of 12/29/2022     Primary Coverage     Payor Plan Insurance Group Employer/Plan Group    [employer] AETNA [id number]     Payor Plan Address Payor Plan Phone Number Payor Plan Fax Number Effective Dates    [address] [address]   4/1/2022 - None Entered    [place] [address]       Subscriber Name Subscriber Birth Date Member ID       ERIC SHOOK 10/17/1957 [id number]                 Emergency Contacts      (Rel.) Home Phone Work Phone Mobile Phone    ALONSO SHOOK (Spouse) -- -- [phone]               History & Physical      Esperanza Enciso, MINOO at 12/29/22 2239     Attestation signed by Sommer Hadley DO at 12/29/22 2313    Documentation reviewed, agree with plan of NP supervised by physician.  There were no concerns on patient care and management made known by the NP to supervising physician.                        [facility]  History &  Physical    Patient Name: Elena Dangelo  : 1960  MRN: [id number]  Primary Care Physician:  Provider, No Known  Date of admission: 2022  Date and Time of Service: 2022 at 2200    Subjective       Chief Complaint: shortness of breath, sputum production, and cough    History of Present Illness: Elena Dangelo is a 62 y.o. female with a past medical history of COPD and tobacco abuse who presented to [facility] on 2022 complaining of shortness of breath.  She was previously seen in the [facility] ED on 2022 was diagnosed with influenza A.  She reports that she completed the course of Tamiflu but today she became very short of breath and also developed a productive cough.  She denies fevers, chills, abdominal pain, nausea, vomiting.    In the ED, respiratory panel was negative.  ABG showed pH 7.43, CO2 42.1, HCO3 28.2.  Troponin is negative, proBNP is 126.9.  Lactate, WBC, and procalcitonin are all within normal limits.  All other labs are unremarkable.  Blood cultures were collected and are pending.  Chest x-ray shows increased  patchy right basilar airspace disease which may relate to pneumonia and emphysema.  EKG shows sinus rhythm, HR 91.  On arrival her SPO2 was 82% on room which improved to  93% on O2 at 3 L per NC.  She is afebrile, all other vital signs are stable.  She was given Solu-Medrol, Levaquin (patient allergic to penicillin) and DuoNebs in the ED.  Hospitalist was consulted for further care and management.    Review of Systems   Constitutional: Negative.   HENT: Negative.    Eyes: Negative.    Cardiovascular: Negative.    Respiratory: Positive for cough, shortness of breath, sputum production and wheezing.    Hematologic/Lymphatic: Negative.    Skin: Negative.    Musculoskeletal: Negative.    Gastrointestinal: Negative.    Genitourinary: Negative.    Neurological: Negative.    Psychiatric/Behavioral: Negative.    Allergic/Immunologic: Negative.          Personal History     Past Medical History:   Diagnosis Date   • COPD (chronic obstructive pulmonary disease) (HCC)        Past Surgical History:   Procedure Laterality Date   • CHOLECYSTECTOMY     • TUBAL ABDOMINAL LIGATION         Family History: family history is not on file. Otherwise pertinent FHx was reviewed and not pertinent to current issue.    Social History:  reports that she has been smoking cigarettes. She has been smoking an average of 1 pack per day. She has never used smokeless tobacco. She reports that she does not drink alcohol.    Home Medications:  Prior to Admission Medications     Prescriptions Last Dose Informant Patient Reported? Taking?    albuterol sulfate  (90 Base) MCG/ACT inhaler   No No    Inhale 2 puffs Every 4 (Four) Hours As Needed for Wheezing or Shortness of Air.    benzonatate (TESSALON) 200 MG capsule   No No    Take 1 capsule by mouth 3 (Three) Times a Day As Needed for Cough.    brompheniramine-pseudoephedrine-DM 30-2-10 MG/5ML syrup   No No    Take 5 mL by mouth 4 (Four) Times a Day As Needed for Congestion or Cough.    Fluticasone-Umeclidin-Vilant (Trelegy Ellipta) 200-62.5-25 MCG/INH inhaler   No No    Inhale 1 puff Daily.    ipratropium-albuterol (DUO-NEB) 0.5-2.5 mg/3 ml nebulizer   No No    Take 3 mL by nebulization Every 4 (Four) Hours As Needed for Wheezing.    oseltamivir (TAMIFLU) 75 MG capsule   No No    Take 1 capsule by mouth 2 (Two) Times a Day.        Allergies:  Allergies   Allergen Reactions   • Azithromycin Anaphylaxis   • Naproxen Anaphylaxis   • Penicillin G Swelling   • Penicillins Anaphylaxis and Swelling   • Codeine Nausea And Vomiting       Objective       Vitals:   Temp:  [98.3 °F (36.8 °C)-99.1 °F (37.3 °C)] 98.4 °F (36.9 °C)  Heart Rate:  [86-99] 86  Resp:  [16-22] 16  BP: (116-121)/(58-68) 116/68    Physical Exam  Vitals and nursing note reviewed.   Constitutional:       Appearance: Normal appearance.      Interventions: Nasal cannula in  place.   HENT:      Head: Normocephalic and atraumatic.      Nose: Nose normal.      Mouth/Throat:      Mouth: Mucous membranes are moist.   Eyes:      Extraocular Movements: Extraocular movements intact.      Pupils: Pupils are equal, round, and reactive to light.   Cardiovascular:      Rate and Rhythm: Normal rate and regular rhythm.      Pulses: Normal pulses.      Heart sounds: Normal heart sounds.   Pulmonary:      Effort: Pulmonary effort is normal.      Breath sounds: Examination of the right-upper field reveals wheezing. Examination of the left-upper field reveals wheezing. Examination of the right-lower field reveals rhonchi. Examination of the left-lower field reveals rhonchi. Wheezing and rhonchi present.      Comments: O2 @ 3L per NC  Abdominal:      General: Bowel sounds are normal.      Palpations: Abdomen is soft.   Musculoskeletal:         General: Normal range of motion.      Cervical back: Normal range of motion.   Skin:     General: Skin is warm and dry.   Neurological:      General: No focal deficit present.      Mental Status: She is alert and oriented to person, place, and time. Mental status is at baseline.   Psychiatric:         Mood and Affect: Mood normal.         Behavior: Behavior normal.         Result Review    Result Review:  I have personally reviewed the results from the time of this admission to 12/29/2022 22:39 EST and agree with these findings:  [x]  Laboratory  [x]  Microbiology  [x]  Radiology  []  EKG/Telemetry   []  Cardiology/Vascular   []  Pathology  []  Old records  []  Other:  Most notable findings include: as above    Assessment & Plan        Active Hospital Problems:  Active Hospital Problems    Diagnosis    • **Pneumonia due to infectious organism, unspecified laterality, unspecified part of lung    • COPD exacerbation (HCC)    • Tobacco abuse      Plan:     Pneumonia  COPD Exacerbation  Tobacco Abuse  -Xray of the chest reviewed   -ABG reviewed  -COVID-negative  -WBC  4.2  -Procalcitonin 0.5  -Blood cultures pending  -Urine Legionella ordered  -Urine strep pneumo ordered  -Respiratory culture ordered  -DuoNebs ordered as needed  -Levaquin given in ED due to patient allergies, continue  -Solumedrol ordered  -Supplemental oxygen to keep sats greater than 92%  -Incentive spirometry  -Cough and deep breathe  -Encourage tobacco cessation  -Nicotine patch ordered      DVT prophylaxis:  Medical DVT prophylaxis orders are present.    CODE STATUS:    Code Status (Patient has no pulse and is not breathing): CPR (Attempt to Resuscitate)  Medical Interventions (Patient has pulse or is breathing): Full Support    Admission Status:  I believe this patient meets inpatient status.    I discussed the patient's findings and my recommendations with patient and family.    This patient has been examined wearing appropriate Personal Protective Equipment  12/29/22      Signature: Electronically signed by Esperanza Enciso DNP, APRN, 12/29/22, 22:39 EST.  [facility]    Electronically signed by Sommer Hadley DO at 12/29/22 9289       Physician Progress Notes (last 24 hours)  Notes from 12/30/22 1424 through 12/31/22 1424   No notes of this type exist for this encounter.            Respiratory Therapy Notes (last 24 hours)      Quinton Martin, CRT at 12/31/22 1239          Exercise Oximetry    Patient Name:Elena Dangelo   MRN: [id number]   Date: 12/31/22             ROOM AIR BASELINE   SpO2% 92% room air   Heart Rate    Blood Pressure      EXERCISE ON ROOM AIR SpO2% EXERCISE ON O2 @ 3 LPM SpO2%   1 MINUTE 92 1 MINUTE    2 MINUTES 92 2 MINUTES    3 MINUTES 87 3 MINUTES 93   4 MINUTES  4 MINUTES 93   5 MINUTES  5 MINUTES 93   6 MINUTES  6 MINUTES 94              Distance Walked   Distance Walked   Dyspnea (Ab Scale)   Dyspnea (Ab Scale)   Fatigue (Ab Scale)   Fatigue (Ab Scale)   SpO2% Post Exercise   SpO2% Post Exercise 95% 3L NC   HR Post Exercise   HR Post Exercise    Time to Recovery   Time to Recovery     Comments: Patient sats 95% on 3L nasal cannula.    Electronically signed by Quinton Martin CRT at 22 1241            [facility] 2C MEDICAL INPATIENT  [address] [facility] [place] [address]  Dept. Phone:  [phone]  Dept. Fax:  [phone] Date Ordered: Dec 31, 2022         Patient:  Elena Dangelo MRN:  [id number]   [address] [facility] [place] [address] :  1960  SSN:    Phone: [phone] Sex:  F     Weight: 61.7 kg (136 lb 0.4 oz)         Ht Readings from Last 1 Encounters:   22 151.1 cm (59.5\")         Oxygen Therapy         (Order ID: [id number])    Diagnosis:  Pneumonia due to infectious organism, unspecified laterality, unspecified part of lung (J18.9 [ICD-10-CM] 486 [ICD-9-CM])   Quantity:  1     Delivery Modality: Nasal Cannula  Liters Per Minute: 3  Duration: Continuous  Equipment:  Oxygen Concentrator &  &  Portable Gaseous Oxygen System & Portable Oxygen Contents Gaseous &  Conserving Regulator  Length of Need (99 Months = Lifetime): 99 Months = Lifetime        Authorizing Provider's Phone: [phone]  Authorizing Provider:Enzo Lo MD  Authorizing Provider's NPI: [id number]  Order Entered By: Enzo Lo MD 2022  2:20 PM     Electronically signed by: Enzo Lo MD 2022  2:20 PM

## 2022-12-31 NOTE — PLAN OF CARE
Goal Outcome Evaluation:  Plan of Care Reviewed With: patient        Progress: improving  Outcome Evaluation: will be discharged.

## 2022-12-31 NOTE — CASE MANAGEMENT/SOCIAL WORK
Continued Stay Note  [facility]     Patient Name: Elean Dangelo  MRN: [id number]  Today's Date: 12/31/2022    Admit Date: 12/29/2022    Plan: Plan to discharge home with new 02 through [facility]. New PCP appt on AVS.   Discharge Plan     Row Name 12/31/22 1510       Plan    Plan Plan to discharge home with new 02 through [facility]. New PCP appt on AVS.    Patient/Family in Agreement with Plan yes    Plan Comments Walking oximetry qualified the patient for 3L cont 02 . Order requested from MD; order placed. Verified patient address and phone number. Patient does not have any current DME and is agreeable to [facility]. Referral placed to [facility] and accepted. Portable tank delivered to patient at bedside via [facility] DME closet. Patient instructed to contact [facility] when she arrives home. Patient verbalized understanding.              Met with patient in room wearing PPE: mask    Dang Calles RN  Weekend   [address] [address]  Office: [phone]  Fax: [phone]  [email or web address]

## 2022-12-31 NOTE — OUTREACH NOTE
Prep Survey    Flowsheet Row Responses   [facility] patient discharged from? [facility]   Is LACE score < 7 ? No   Eligibility [facility]   Date of Admission 12/29/22   Date of Discharge 12/31/22   Discharge Disposition Home or Self Care   Discharge diagnosis Pneumonia   Does the patient have one of the following disease processes/diagnoses(primary or secondary)? Pneumonia   Does the patient have Home health ordered? No   Is there a DME ordered? Yes   What DME was ordered? oxygen at [facility]L [facility]   Comments regarding appointments new PCP appt   Prep survey completed? Yes          MOHINI KUNZ - Registered Nurse

## 2023-01-01 LAB
BACTERIA SPEC RESP CULT: NORMAL
GRAM STN SPEC: NORMAL

## 2023-01-02 ENCOUNTER — TRANSITIONAL CARE MANAGEMENT TELEPHONE ENCOUNTER (OUTPATIENT)
Dept: CALL CENTER | Facility: HOSPITAL | Age: 63
End: 2023-01-02
Payer: COMMERCIAL

## 2023-01-02 NOTE — OUTREACH NOTE
Call Center TCM Note    Flowsheet Row Responses   [facility] patient discharged from? [facility]   Does the patient have one of the following disease processes/diagnoses(primary or secondary)? Pneumonia   TCM attempt successful? Yes   Call start time 1113   Call end time 1114   Discharge diagnosis Pneumonia   Meds reviewed with patient/caregiver? Yes   Is the patient having any side effects they believe may be caused by any medication additions or changes? No   Does the patient have all medications ordered at discharge? Yes   Prescription comments Pt c/o stomach discomfort with antibiotic but continues to take    Is the patient taking all medications as directed (includes completed medication regime)? Yes   Comments Hosp dc fu apt/new pt apt on 1/4/23   Does the patient have an appointment with their PCP within 7 days of discharge? Yes   Has home health visited the patient within 72 hours of discharge? N/A   Pulse Ox monitoring Intermittent   Pulse Ox device source Patient   O2 Sat comments Patient states she is wearing 3L O2 prn ,  O2 sats are 93% RA   O2 Sat: education provided Sat levels, Monitoring frequency, When to seek care   Did the patient receive a copy of their discharge instructions? Yes   Nursing interventions Reviewed instructions with patient   What is the patient's perception of their health status since discharge? Improving   Nursing Interventions Nurse provided patient education   If the patient is a current smoker, are they able to teach back resources for cessation? [id number]   Is the patient/caregiver able to teach back the hierarchy of who to call/visit for symptoms/problems? PCP, Specialist, Home health nurse, Urgent Care, ED, 911 Yes   Is the patient/caregiver able to teach back signs and symptoms of worsening condition: Fever/chills, Shortness of breath, Chest pain   Is the patient/caregiver able to teach back importance of completing antibiotic course of treatment? Yes   TCM call  completed? Yes   Call end time [id number]          Marcia Rodríguez RN    1/2/2023, 11:16 EST

## 2023-01-03 LAB
BACTERIA SPEC AEROBE CULT: NORMAL
BACTERIA SPEC AEROBE CULT: NORMAL

## 2023-01-03 NOTE — PROGRESS NOTES
Transitional Care Follow Up Visit  Subjective     Elena is a 62 y.o. female who presents for a transitional care management visit.    Within 48 business hours after discharge our office contacted her via telephone to coordinate her care and needs.      I reviewed and discussed the details of that call along with the discharge summary, hospital problems, inpatient lab results, inpatient diagnostic studies, and consultation reports with Elena.     Current outpatient and discharge medications have been reconciled for the patient.  Reviewed by: MINOO Reeves      Date of TCM Phone Call 12/31/2022   [facility]   Date of Admission 12/29/2022   Date of Discharge 12/31/2022   Discharge Disposition Home or Self Care       Risk for Readmission (LACE) Score: 7 (12/31/2022  6:00 AM)      History of Present Illness  Elena Dangelo is a 62 year old female who presents to the office today for hospital follow-up.    Pneumonia- Having some shortness of breath, but better. Has 1 dose of antibiotic left. Does have breathing treatments at home. Feeling much better. Did not get steroids upon discharge and previously has had steroids. Reports she smokes under a pack of day and trying to quit.        Course During Hospital Stay The following information was reviewed by: MINOO Reeves on 01/04/2023:     Upon review of record, patient recently in the hospital on 12/29/2022 for pneumonia.  It was noted patient was recently diagnosed with influenza.  It is uncertain if patient was discharged home on oxygen as she had 6-minute walk prior to discharge.  Patient was discharged home on levofloxacin.     The following portions of the patient's history were reviewed and updated as appropriate: allergies, current medications, past family history, past medical history, past social history, past surgical history and problem list.     Vitals:    01/04/23 1408   BP: 121/69   Pulse: 96   Resp: 18   Temp: 98.2 °F  (36.8 °C)   TempSrc: Temporal   SpO2: 97%   Weight: 61.7 kg (136 lb)   Height: 149.9 cm (59\")       Review of Systems   Constitutional: Positive for fatigue. Negative for chills and fever.   HENT: Positive for congestion.    Respiratory: Positive for cough, shortness of breath and wheezing.    Cardiovascular: Negative for chest pain.   Gastrointestinal: Negative for abdominal pain, nausea and vomiting.   Genitourinary: Negative for difficulty urinating.   Musculoskeletal: Negative for arthralgias.   Skin: Negative.    Neurological: Negative for dizziness, light-headedness and headaches.       Objective   Physical Exam  Vitals reviewed.   Constitutional:       Appearance: She is normal weight. She is ill-appearing.   HENT:      Head: Normocephalic and atraumatic.   Eyes:      Extraocular Movements: Extraocular movements intact.      Conjunctiva/sclera: Conjunctivae normal.   Cardiovascular:      Rate and Rhythm: Normal rate and regular rhythm.      Pulses: Normal pulses.      Heart sounds: Normal heart sounds.      Comments: S1, S2 audible  Pulmonary:      Effort: Pulmonary effort is normal.      Breath sounds: Wheezing present.      Comments: Expiratory wheezing noted throughout all lobes, Currently on room air   Abdominal:      General: Abdomen is flat. Bowel sounds are normal.      Palpations: Abdomen is soft.   Musculoskeletal:         General: Normal range of motion.      Cervical back: Normal range of motion.   Skin:     General: Skin is warm and dry.   Neurological:      General: No focal deficit present.      Mental Status: She is alert and oriented to person, place, and time. Mental status is at baseline.   Psychiatric:         Mood and Affect: Mood normal.         Behavior: Behavior normal.         Thought Content: Thought content normal.         Judgment: Judgment normal.           Assessment & Plan     Diagnoses and all orders for this visit:    1. Pneumonia due to infectious organism, unspecified  laterality, unspecified part of lung (Primary)  Assessment & Plan:  Having some shortness of breath, but better. Has 1 dose of antibiotic left. Does have breathing treatments at home. Feeling much better. Did not get steroids upon discharge and previously has had steroids. Reports she smokes under a pack of day and trying to quit.     On room air - was on oxygen originally on oxygen, but satting 94-95% on room air     Prescribed predisone     Continue albuterol and duonebs/complete antibiotic    Re-filled inhalers       2. Tobacco abuse  Assessment & Plan:  Smokes less then a pack of day     Nicotine patches ordered       Other orders  -     predniSONE (DELTASONE) 10 MG (48) dose pack; Take as instructed on package  Dispense: 48 tablet; Refill: 0  -     nicotine (NICODERM CQ) 21 MG/24HR patch; Place 1 patch on the skin as directed by provider Daily.  Dispense: 28 patch; Refill: 2  -     Fluticasone-Umeclidin-Vilant (Trelegy Ellipta) 200-62.5-25 MCG/ACT aerosol powder ; Inhale 1 puff Daily.  Dispense: 28 each; Refill: 2  -     albuterol sulfate  (90 Base) MCG/ACT inhaler; Inhale 2 puffs Every 4 (Four) Hours As Needed for Wheezing or Shortness of Air.  Dispense: 1 g; Refill: 3  -     ipratropium-albuterol (DUO-NEB) 0.5-2.5 mg/3 ml nebulizer; Take 3 mL by nebulization Every 4 (Four) Hours As Needed for Wheezing.  Dispense: 90 mL; Refill: 3      Follow Up     Return in about 1 month (around 2/4/2023) for Annual physical.

## 2023-01-04 ENCOUNTER — OFFICE VISIT (OUTPATIENT)
Dept: FAMILY MEDICINE CLINIC | Facility: CLINIC | Age: 63
End: 2023-01-04
Payer: COMMERCIAL

## 2023-01-04 VITALS
DIASTOLIC BLOOD PRESSURE: 69 MMHG | BODY MASS INDEX: 27.42 KG/M2 | RESPIRATION RATE: 18 BRPM | HEIGHT: 59 IN | WEIGHT: 136 LBS | HEART RATE: 96 BPM | OXYGEN SATURATION: 97 % | TEMPERATURE: 98.2 F | SYSTOLIC BLOOD PRESSURE: 121 MMHG

## 2023-01-04 DIAGNOSIS — Z72.0 TOBACCO ABUSE: ICD-10-CM

## 2023-01-04 DIAGNOSIS — J18.9 PNEUMONIA DUE TO INFECTIOUS ORGANISM, UNSPECIFIED LATERALITY, UNSPECIFIED PART OF LUNG: Primary | ICD-10-CM

## 2023-01-04 PROCEDURE — 99495 TRANSJ CARE MGMT MOD F2F 14D: CPT | Performed by: NURSE PRACTITIONER

## 2023-01-04 PROCEDURE — 1159F MED LIST DOCD IN RCRD: CPT | Performed by: NURSE PRACTITIONER

## 2023-01-04 PROCEDURE — 1160F RVW MEDS BY RX/DR IN RCRD: CPT | Performed by: NURSE PRACTITIONER

## 2023-01-04 RX ORDER — ALBUTEROL SULFATE 90 UG/1
2 AEROSOL, METERED RESPIRATORY (INHALATION) EVERY 4 HOURS PRN
Qty: 1 G | Refills: 3 | Status: SHIPPED | OUTPATIENT
Start: 2023-01-04 | End: 2023-01-12 | Stop reason: SDUPTHER

## 2023-01-04 RX ORDER — NICOTINE 21 MG/24HR
1 PATCH, TRANSDERMAL 24 HOURS TRANSDERMAL EVERY 24 HOURS
Qty: 28 PATCH | Refills: 2 | Status: SHIPPED | OUTPATIENT
Start: 2023-01-04

## 2023-01-04 RX ORDER — IPRATROPIUM BROMIDE AND ALBUTEROL SULFATE 2.5; .5 MG/3ML; MG/3ML
3 SOLUTION RESPIRATORY (INHALATION) EVERY 4 HOURS PRN
Qty: 90 ML | Refills: 3 | Status: SHIPPED | OUTPATIENT
Start: 2023-01-04

## 2023-01-04 RX ORDER — PREDNISONE 10 MG/1
TABLET ORAL
Qty: 48 TABLET | Refills: 0 | Status: SHIPPED | OUTPATIENT
Start: 2023-01-04 | End: 2023-03-07

## 2023-01-04 RX ORDER — EPINEPHRINE 0.3 MG/.3ML
INJECTION SUBCUTANEOUS
COMMUNITY
Start: 2022-11-14

## 2023-01-04 NOTE — ASSESSMENT & PLAN NOTE
Having some shortness of breath, but better. Has 1 dose of antibiotic left. Does have breathing treatments at home. Feeling much better. Did not get steroids upon discharge and previously has had steroids. Reports she smokes under a pack of day and trying to quit.     On room air - was on oxygen originally on oxygen, but satting 94-95% on room air     Prescribed predisone     Continue albuterol and duonebs/complete antibiotic    Re-filled inhalers

## 2023-01-04 NOTE — PATIENT INSTRUCTIONS
Continue breathing treatments  Continue to cough and deep breath  Finish antibiotic     Prescribed nicotine patches and steroid pack

## 2023-01-05 ENCOUNTER — TELEPHONE (OUTPATIENT)
Dept: FAMILY MEDICINE CLINIC | Facility: CLINIC | Age: 63
End: 2023-01-05

## 2023-01-05 DIAGNOSIS — J44.1 COPD EXACERBATION: Primary | ICD-10-CM

## 2023-01-05 NOTE — TELEPHONE ENCOUNTER
Caller: Elena Dangelo    Relationship: Self    Best call back number: [phone]    What orders are you requesting (i.e. lab or imaging):  OXYGEN TANKS     In what timeframe would the patient need to come in:     Where will you receive your lab/imaging services: [facility]     Additional notes: PATIENT CALLED TO HAVE OXYGEN PICKED UP. [facility] STATED THEY WOULD NEED ORDER FROM PROVIDER

## 2023-01-06 ENCOUNTER — PATIENT ROUNDING (BHMG ONLY) (OUTPATIENT)
Dept: FAMILY MEDICINE CLINIC | Facility: CLINIC | Age: 63
End: 2023-01-06
Payer: COMMERCIAL

## 2023-01-12 NOTE — TELEPHONE ENCOUNTER
Caller: Elena Dangelo    Relationship: Self    Best call back number: [phone]    Requested Prescriptions:   Requested Prescriptions     Pending Prescriptions Disp Refills   • albuterol sulfate  (90 Base) MCG/ACT inhaler 1 g 3     Sig: Inhale 2 puffs Every 4 (Four) Hours As Needed for Wheezing or Shortness of Air.        Pharmacy where request should be sent: [id number] - [address] - [phone]  - [phone]     Additional details provided by patient: PATIENT STATES SHE NEEDS THE SOLUTION    Does the patient have less than a 3 day supply:  [x] Yes  [] No    Would you like a call back once the refill request has been completed: [x] Yes [] No    If the office needs to give you a call back, can they leave a voicemail: [x] Yes [] No    Alvaro Mcdonald Rep   01/12/23 12:32 EST

## 2023-01-12 NOTE — TELEPHONE ENCOUNTER
Caller: Elena Dangelo    Relationship: Self    Best call back number: [phone]    What is the best time to reach you:ANYTIME    Who are you requesting to speak with (clinical staff, provider,  specific staff member):CLINCIAL    What was the call regarding: PATIENT STATES SHE WAS TOLD SHE WOULD NEED A RELEASE FROM HER PCP TO GET HER OXYGEN FROM WHEN SHE WAS IN THE HOSPITAL AND STATES THEY RECEIVED  IT BUT HAVE NOT PICKEDUP. PATIENT IS REQUESTING A CALLBACK FOR AN UPDATE.

## 2023-01-13 RX ORDER — ALBUTEROL SULFATE 90 UG/1
2 AEROSOL, METERED RESPIRATORY (INHALATION) EVERY 4 HOURS PRN
Qty: 1 G | Refills: 3 | Status: SHIPPED | OUTPATIENT
Start: 2023-01-13

## 2023-01-14 ENCOUNTER — TELEPHONE (OUTPATIENT)
Dept: FAMILY MEDICINE CLINIC | Facility: CLINIC | Age: 63
End: 2023-01-14
Payer: COMMERCIAL

## 2023-01-14 DIAGNOSIS — J43.8 OTHER EMPHYSEMA: Primary | ICD-10-CM

## 2023-01-14 RX ORDER — ALBUTEROL SULFATE 0.63 MG/3ML
1 SOLUTION RESPIRATORY (INHALATION) EVERY 4 HOURS PRN
Qty: 180 EACH | Refills: 0 | Status: SHIPPED | OUTPATIENT
Start: 2023-01-14 | End: 2023-01-30 | Stop reason: SDUPTHER

## 2023-01-14 NOTE — TELEPHONE ENCOUNTER
Received call on Saturday morning from patient.  Told me that [facility] pharmacy did not have the nebulizer medicine sent in over a week ago. (Looks like that was [facility])  She is out.  Told me that they did have albuterol vials.  She told me that she takes 1 every 4 hours.  I sent in a prescription for 1 month supply for albuterol nebulizer vials.

## 2023-01-30 DIAGNOSIS — J43.8 OTHER EMPHYSEMA: ICD-10-CM

## 2023-01-30 RX ORDER — ALBUTEROL SULFATE 0.63 MG/3ML
1 SOLUTION RESPIRATORY (INHALATION) EVERY 4 HOURS PRN
Qty: 180 EACH | Refills: 0 | Status: SHIPPED | OUTPATIENT
Start: 2023-01-30 | End: 2023-03-20 | Stop reason: SDUPTHER

## 2023-01-30 RX ORDER — ALBUTEROL SULFATE 0.63 MG/3ML
SOLUTION RESPIRATORY (INHALATION)
Qty: 150 ML | Refills: 0 | OUTPATIENT
Start: 2023-01-30

## 2023-03-01 ENCOUNTER — TELEPHONE (OUTPATIENT)
Dept: FAMILY MEDICINE CLINIC | Facility: CLINIC | Age: 63
End: 2023-03-01
Payer: COMMERCIAL

## 2023-03-01 NOTE — TELEPHONE ENCOUNTER
Caller: Elena Dangelo    Relationship: Self    Best call back number: [phone]    Requested Prescriptions:   Requested Prescriptions     Pending Prescriptions Disp Refills   • Fluticasone-Umeclidin-Vilant (Trelegy Ellipta) 200-62.5-25 MCG/ACT aerosol powder  28 each 2     Sig: Inhale 1 puff Daily.        Pharmacy where request should be sent: [facility] [address] [place], [place] [id number] [place], [place] [phone]     Additional details provided by patient:   PATIENT IS OUT OF THIS MEDICATION Does the patient have less than a 3 day supply:  [x] Yes  [] No    Would you like a call back once the refill request has been completed: [x] Yes [] No    If the office needs to give you a call back, can they leave a voicemail: [x] Yes [] No    Alvaro Alas Rep   03/01/23 11:13 EST

## 2023-03-02 ENCOUNTER — TELEPHONE (OUTPATIENT)
Dept: FAMILY MEDICINE CLINIC | Facility: CLINIC | Age: 63
End: 2023-03-02

## 2023-03-02 RX ORDER — BUDESONIDE AND FORMOTEROL FUMARATE DIHYDRATE 160; 4.5 UG/1; UG/1
2 AEROSOL RESPIRATORY (INHALATION)
Qty: 10.2 G | Refills: 1 | Status: SHIPPED | OUTPATIENT
Start: 2023-03-02

## 2023-03-02 NOTE — TELEPHONE ENCOUNTER
Caller: MIQUEL SANCHEZ    Relationship: Emergency Contact    Best call back number:    MIQUEL SANCHEZ () [phone]     What was the call regarding: PHARMACY IS TELLING PATIENT THAT THE NEW MEDICATION DOESN'T EXIST     Do you require a callback:  YES

## 2023-03-02 NOTE — TELEPHONE ENCOUNTER
HUB RELAYED MESSAGE TO PATIENT'S DAUGHTER, PATIENT'S DAUGHTER STATES PRESCRIPTION IS TOO HIGH AND THERE IS NO GENERIC FORM OF THIS INHALER. SHE WANTS TO KNOW IF ANYTHING DIFFERENT CAN BE CALLED IN THAT IS CHEAPER. PATIENT IS CURRENTLY AT HOME WITH COVID AND SHE HAS COPD SO DAUGHTER IS TRYING TO GET HER AN INHALER THAT WILL HELP HER.    PLEASE ADVISE    CALLBACK: [phone]

## 2023-03-02 NOTE — TELEPHONE ENCOUNTER
Caller: MIQUEL    Relationship: Child    Best call back number: [phone]    What is the best time to reach you: ANYTIME    Who are you requesting to speak with (clinical staff, provider,  specific staff member): CLINICAL     What was the call regarding: PATIENT'S DAUGHTER STATES [facility] [address] [place] [address], [place] - [facility] - [phone] [place] [phone] [phone] SAID THE PATIENT'S Fluticasone-Umeclidin-Vilant (Trelegy Ellipta) 200-62.5-25 MCG/ACT aerosol powder  PRESCRIPTION WAS SENT TO [facility] INSTEAD. PATIENT'S DAUGHTER STATES SHE CAN SEE IN MYCHART THAT IT SAYS THE PRESCRIPTION WAS SENT TO [facility] [address] [place] [address], [place] - [address] - [phone] [place] [phone] [phone] ACCORDING TO THE AFTER SUMMARY NOTES OF THE PATIENT'S APPOINTMENT.     Do you require a callback: YES

## 2023-03-02 NOTE — TELEPHONE ENCOUNTER
HUB to share    Can you call and ask what medication she is talking about? I re-sent the Trelegy to the correct pharmacy - [facility]    LVM asking Nancy

## 2023-03-07 ENCOUNTER — OFFICE VISIT (OUTPATIENT)
Dept: FAMILY MEDICINE CLINIC | Facility: CLINIC | Age: 63
End: 2023-03-07
Payer: COMMERCIAL

## 2023-03-07 VITALS
BODY MASS INDEX: 27.42 KG/M2 | OXYGEN SATURATION: 93 % | RESPIRATION RATE: 18 BRPM | WEIGHT: 136 LBS | HEART RATE: 103 BPM | SYSTOLIC BLOOD PRESSURE: 125 MMHG | TEMPERATURE: 98.2 F | DIASTOLIC BLOOD PRESSURE: 72 MMHG | HEIGHT: 59 IN

## 2023-03-07 DIAGNOSIS — J39.8 CONGESTION OF UPPER RESPIRATORY TRACT: ICD-10-CM

## 2023-03-07 DIAGNOSIS — R50.9 FEVER, UNSPECIFIED FEVER CAUSE: Primary | ICD-10-CM

## 2023-03-07 DIAGNOSIS — J96.00 ACUTE RESPIRATORY FAILURE, UNSPECIFIED WHETHER WITH HYPOXIA OR HYPERCAPNIA: ICD-10-CM

## 2023-03-07 DIAGNOSIS — J44.0 COPD WITH LOWER RESPIRATORY INFECTION: ICD-10-CM

## 2023-03-07 DIAGNOSIS — J44.1 COPD EXACERBATION: ICD-10-CM

## 2023-03-07 LAB
EXPIRATION DATE: NORMAL
FLUAV AG UPPER RESP QL IA.RAPID: NOT DETECTED
FLUBV AG UPPER RESP QL IA.RAPID: NOT DETECTED
INTERNAL CONTROL: NORMAL
Lab: NORMAL
SARS-COV-2 AG UPPER RESP QL IA.RAPID: NOT DETECTED

## 2023-03-07 PROCEDURE — 99213 OFFICE O/P EST LOW 20 MIN: CPT | Performed by: NURSE PRACTITIONER

## 2023-03-07 PROCEDURE — 87428 SARSCOV & INF VIR A&B AG IA: CPT | Performed by: NURSE PRACTITIONER

## 2023-03-07 RX ORDER — HYDROCODONE POLISTIREX AND CHLORPHENIRAMINE POLISTIREX 10; 8 MG/5ML; MG/5ML
5 SUSPENSION, EXTENDED RELEASE ORAL EVERY 12 HOURS PRN
Qty: 70 ML | Refills: 0 | Status: SHIPPED | OUTPATIENT
Start: 2023-03-07

## 2023-03-07 RX ORDER — DOXYCYCLINE HYCLATE 100 MG/1
100 CAPSULE ORAL 2 TIMES DAILY
Qty: 14 CAPSULE | Refills: 0 | Status: SHIPPED | OUTPATIENT
Start: 2023-03-07

## 2023-03-07 RX ORDER — PREDNISONE 10 MG/1
TABLET ORAL
Qty: 48 TABLET | Refills: 0 | Status: SHIPPED | OUTPATIENT
Start: 2023-03-07

## 2023-03-07 NOTE — PROGRESS NOTES
"Chief Complaint  Cough     Subjective          Elena Dye is a 62-year-old female who presents to the office today with complaints of cough.    Cough- Started over a week ago. Coughing harshly and has had a fever, She had a sore throat a couple of days. She reports she is always a little congested. She reports she was unable to get Trelegy and was without it for 4 days and things kept getting worse in chest. Has been coughing up green and cream sputum. Her right side of chest is sore and has hurt. She reports she has coughed so hard she has thrown up. 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 Has not been checking oxygen at home. Has had fever and chills. Has tried breathing treatments and using Thiago's vapor rub and have not helped much. Has fatigue.       Review of Systems   Constitutional: Positive for chills and fever.   HENT: Positive for congestion.    Respiratory: Positive for cough, shortness of breath and wheezing.    Cardiovascular: Positive for chest pain.   Gastrointestinal: Positive for nausea and vomiting.   Genitourinary: Negative for difficulty urinating.   Musculoskeletal: Positive for arthralgias.   Skin: Negative.    Neurological: Positive for headache.        Objective   Vital Signs:   Vitals:    03/07/23 1412   BP: 125/72   Pulse: 103   Resp: 18   Temp: 98.2 °F (36.8 °C)   SpO2: 93%      Estimated body mass index is 27.47 kg/m² as calculated from the following:    Height as of this encounter: 149.9 cm (59\").    Weight as of this encounter: 61.7 kg (136 lb).          Physical Exam  Vitals reviewed.   Constitutional:       Appearance: Normal appearance. She is normal weight. She is ill-appearing.   HENT:      Head: Normocephalic and atraumatic.   Eyes:      Extraocular Movements: Extraocular movements intact.      Conjunctiva/sclera: Conjunctivae normal.   Cardiovascular:      Rate and Rhythm: Regular rhythm. Tachycardia present.      Pulses: Normal pulses.      Heart sounds: Normal " heart sounds.      Comments: S1, S2 audible  Pulmonary:      Effort: Pulmonary effort is normal.      Comments: Expiratory wheezing noted throughout all lobes, Currently on room air   Abdominal:      General: Abdomen is flat.   Musculoskeletal:         General: Normal range of motion.      Cervical back: Normal range of motion.   Skin:     General: Skin is warm and dry.   Neurological:      General: No focal deficit present.      Mental Status: She is alert and oriented to person, place, and time. Mental status is at baseline.   Psychiatric:         Mood and Affect: Mood normal.         Behavior: Behavior normal.         Thought Content: Thought content normal.         Judgment: Judgment normal.                Physical Exam   Result Review :             Procedures       Assessment and Plan     Diagnoses and all orders for this visit:    1. Fever, unspecified fever cause (Primary)  -     POCT SARS-CoV-2 Antigen GLENIS    2. Congestion of upper respiratory tract  -     POCT SARS-CoV-2 Antigen GLENIS  -     Hydrocod James-Chlorphe James ER (Tussionex Pennkinetic ER) 10-8 MG/5ML ER suspension; Take 5 mL by mouth Every 12 (Twelve) Hours As Needed for Cough.  Dispense: 70 mL; Refill: 0    3. COPD exacerbation (HCC)    4. COPD with lower respiratory infection (HCC)  -     Home Nebulizer Accessories    5. Acute respiratory failure, unspecified whether with hypoxia or hypercapnia (HCC)  Assessment & Plan:  Started over a week ago. Coughing harshly and has had a fever, She had a sore throat a couple of days. She reports she is always a little congested. She reports she was unable to get Trelegy and was without it for 4 days and things kept getting worse in chest. Has been coughing up green and cream sputum. Her right side of chest is sore and has hurt. She reports she has coughed so hard she has thrown up. 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 Has not been checking oxygen at home. Has had fever and chills. Has tried  breathing treatments and using Thiago's vapor rub and have not helped much. Has fatigue.    COVID and flu negative    Likely respiratory bacterial infection     Encourage mucinex   Continue inhalers   Sample of Yupleri given  - Continue Duoneb if does not work   Prescribed tussionex,  predisone, doxycycline     Order for nebulizer given to patient       Other orders  -     doxycycline (VIBRAMYCIN) 100 MG capsule; Take 1 capsule by mouth 2 (Two) Times a Day.  Dispense: 14 capsule; Refill: 0  -     predniSONE (DELTASONE) 10 MG (48) dose pack; Take per package instruction  Dispense: 48 tablet; Refill: 0            Follow Up   Return if symptoms worsen or fail to improve.   Patient was given instructions and counseling regarding her condition or for health maintenance advice. Please see specific information pulled into the AVS if appropriate.

## 2023-03-07 NOTE — PATIENT INSTRUCTIONS
Encourage mucinex   Continue inhalers   Sample of Yupleri given  - Continue Duoneb if does not work   Prescribed tussionex,  predisone, doxycycline   Order for nebulizer given to patient

## 2023-03-20 ENCOUNTER — TELEPHONE (OUTPATIENT)
Dept: FAMILY MEDICINE CLINIC | Facility: CLINIC | Age: 63
End: 2023-03-20
Payer: COMMERCIAL

## 2023-03-20 DIAGNOSIS — J43.8 OTHER EMPHYSEMA: ICD-10-CM

## 2023-03-20 RX ORDER — ALBUTEROL SULFATE 0.63 MG/3ML
1 SOLUTION RESPIRATORY (INHALATION) EVERY 4 HOURS PRN
Qty: 180 EACH | Refills: 2 | Status: SHIPPED | OUTPATIENT
Start: 2023-03-20

## 2023-03-20 NOTE — TELEPHONE ENCOUNTER
Incoming Refill Request      Medication requested (name and dose):   albuterol (ACCUNEB) 0.63 MG/3ML nebulizer solution  1 ampule, Every 4 Hours PRN         Pharmacy where request should be sent: [facility]/pharmacy #[id number]  [place], [place] - [address] [facility] [address] - [phone]  - [phone]   [phone]    Additional details provided by patient: PATIENT IS OUT OF MEDICATION     Best call back number: [phone]    Does the patient have less than a 3 day supply:  [x] Yes  [] No    Alvaro Jean Baptiste Rep  03/20/23, 15:33 EDT

## 2023-05-05 RX ORDER — FLUTICASONE FUROATE, UMECLIDINIUM BROMIDE AND VILANTEROL TRIFENATATE 200; 62.5; 25 UG/1; UG/1; UG/1
POWDER RESPIRATORY (INHALATION)
Qty: 60 EACH | Refills: 0 | Status: SHIPPED | OUTPATIENT
Start: 2023-05-05

## 2023-05-05 NOTE — TELEPHONE ENCOUNTER
Rx Refill Note  Requested Prescriptions     Pending Prescriptions Disp Refills   • Trelegy Ellipta 200-62.5-25 MCG/ACT aerosol powder  [Pharmacy Med Name: Trelegy Ellipta Inhalation Aerosol Powder Breath Activated 200-62.5-25 MCG/ACT] 60 each 0     Sig: Inhale 1 puff Daily.      Last office visit with prescribing clinician: 3/7/2023   Last telemedicine visit with prescribing clinician: Visit date not found   Next office visit with prescribing clinician: Visit date not found                         Would you like a call back once the refill request has been completed: [] Yes [] No    If the office needs to give you a call back, can they leave a voicemail: [] Yes [] No    Shasta Roy, RT  05/05/23, 08:07 EDT

## 2023-05-26 DIAGNOSIS — J43.8 OTHER EMPHYSEMA: ICD-10-CM

## 2023-05-26 RX ORDER — BUDESONIDE AND FORMOTEROL FUMARATE DIHYDRATE 160; 4.5 UG/1; UG/1
2 AEROSOL RESPIRATORY (INHALATION)
Qty: 10.2 G | Refills: 1 | Status: SHIPPED | OUTPATIENT
Start: 2023-05-26

## 2023-05-26 RX ORDER — ALBUTEROL SULFATE 0.63 MG/3ML
1 SOLUTION RESPIRATORY (INHALATION) EVERY 4 HOURS PRN
Qty: 180 EACH | Refills: 2 | Status: SHIPPED | OUTPATIENT
Start: 2023-05-26

## 2023-05-26 NOTE — TELEPHONE ENCOUNTER
Caller: Elena Dangelo    Relationship: Self    Best call back number:  Elena Dangelo (Self) [phone] (Mobile)       Requested Prescriptions:   Requested Prescriptions     Pending Prescriptions Disp Refills   • albuterol (ACCUNEB) 0.63 MG/3ML nebulizer solution 180 each 2     Sig: Take 3 mL by nebulization Every 4 (Four) Hours As Needed for Wheezing.   • budesonide-formoterol (Symbicort) 160-4.5 MCG/ACT inhaler 10.2 g 1     Sig: Inhale 2 puffs 2 (Two) Times a Day.        Pharmacy where request should be sent: [id number] - [place], [place] - [address] - [phone] [facility] [phone]      Last office visit with prescribing clinician: 3/7/2023   Last telemedicine visit with prescribing clinician: Visit date not found   Next office visit with prescribing clinician: Visit date not found     Additional details provided by patient:     Does the patient have less than a 3 day supply:  [x] Yes  [] No    Would you like a call back once the refill request has been completed: [] Yes [] No    If the office needs to give you a call back, can they leave a voicemail: [] Yes [] No    Alvaro Proctor   05/26/23 14:04 EDT

## 2023-06-12 RX ORDER — FLUTICASONE FUROATE, UMECLIDINIUM BROMIDE AND VILANTEROL TRIFENATATE 200; 62.5; 25 UG/1; UG/1; UG/1
POWDER RESPIRATORY (INHALATION)
Qty: 60 EACH | Refills: 0 | Status: SHIPPED | OUTPATIENT
Start: 2023-06-12 | End: 2023-06-16 | Stop reason: SDUPTHER

## 2023-06-15 ENCOUNTER — PRIOR AUTHORIZATION (OUTPATIENT)
Dept: FAMILY MEDICINE CLINIC | Facility: CLINIC | Age: 63
End: 2023-06-15
Payer: COMMERCIAL

## 2023-06-15 NOTE — TELEPHONE ENCOUNTER
HUB to read    PA states that the Trellegy has to be sent in for a 90 day supply per her insurance.     Your PA request cannot be processed electronically. For further inquiries please contact the number on the back of the member prescription card.

## 2023-06-15 NOTE — TELEPHONE ENCOUNTER
HUB to read    PA was sent for [facility] 200-62.5-25MCG/ACT aerosol powder    Waiting on the outcome from insurance.

## 2023-06-16 RX ORDER — FLUTICASONE FUROATE, UMECLIDINIUM BROMIDE AND VILANTEROL TRIFENATATE 200; 62.5; 25 UG/1; UG/1; UG/1
1 POWDER RESPIRATORY (INHALATION) DAILY
Qty: 180 EACH | Refills: 0 | Status: SHIPPED | OUTPATIENT
Start: 2023-06-16

## 2023-10-16 DIAGNOSIS — J43.8 OTHER EMPHYSEMA: ICD-10-CM

## 2023-10-16 RX ORDER — ALBUTEROL SULFATE 0.63 MG/3ML
SOLUTION RESPIRATORY (INHALATION)
Qty: 150 ML | Refills: 0 | Status: SHIPPED | OUTPATIENT
Start: 2023-10-16

## 2023-10-16 NOTE — TELEPHONE ENCOUNTER
Rx Refill Note  Requested Prescriptions     Pending Prescriptions Disp Refills    albuterol (ACCUNEB) 0.63 MG/3ML nebulizer solution [Pharmacy Med Name: Albuterol Sulfate Inhalation Nebulization Solution 0.63 MG/3ML] 150 mL 0     Sig: USE 1 AMPULE IN NEBULIZER EVERY 4 HOURS AS NEEDED FOR WHEEZING      Last office visit with prescribing clinician: 3/7/2023   Last telemedicine visit with prescribing clinician: Visit date not found   Next office visit with prescribing clinician: Visit date not found     CBC & Differential (12/31/2022 02:54)   Basic Metabolic Panel (12/31/2022 02:54)                       Would you like a call back once the refill request has been completed: [] Yes [] No    If the office needs to give you a call back, can they leave a voicemail: [] Yes [] No    Shraddha Hadley MA  10/16/23, 11:50 EDT

## 2023-11-13 ENCOUNTER — OFFICE VISIT (OUTPATIENT)
Dept: FAMILY MEDICINE CLINIC | Facility: CLINIC | Age: 63
End: 2023-11-13
Payer: COMMERCIAL

## 2023-11-13 VITALS
DIASTOLIC BLOOD PRESSURE: 84 MMHG | SYSTOLIC BLOOD PRESSURE: 152 MMHG | BODY MASS INDEX: 27.42 KG/M2 | TEMPERATURE: 98.4 F | OXYGEN SATURATION: 98 % | WEIGHT: 136 LBS | HEIGHT: 59 IN | HEART RATE: 86 BPM

## 2023-11-13 DIAGNOSIS — R09.89 CHEST CONGESTION: ICD-10-CM

## 2023-11-13 DIAGNOSIS — R05.9 COUGH, UNSPECIFIED TYPE: ICD-10-CM

## 2023-11-13 DIAGNOSIS — J43.8 OTHER EMPHYSEMA: ICD-10-CM

## 2023-11-13 DIAGNOSIS — R52 GENERALIZED BODY ACHES: Primary | ICD-10-CM

## 2023-11-13 DIAGNOSIS — J44.1 COPD EXACERBATION: ICD-10-CM

## 2023-11-13 RX ORDER — NICOTINE 4 MG/1
2 INHALANT RESPIRATORY (INHALATION) AS NEEDED
Qty: 30 EACH | Refills: 0 | Status: SHIPPED | OUTPATIENT
Start: 2023-11-13 | End: 2023-11-14 | Stop reason: SDUPTHER

## 2023-11-13 RX ORDER — CEFDINIR 300 MG/1
300 CAPSULE ORAL 2 TIMES DAILY
Qty: 14 CAPSULE | Refills: 0 | Status: SHIPPED | OUTPATIENT
Start: 2023-11-13

## 2023-11-13 RX ORDER — BENZONATATE 200 MG/1
200 CAPSULE ORAL 3 TIMES DAILY PRN
Qty: 30 CAPSULE | Refills: 0 | Status: SHIPPED | OUTPATIENT
Start: 2023-11-13

## 2023-11-13 RX ORDER — ALBUTEROL SULFATE 0.63 MG/3ML
1 SOLUTION RESPIRATORY (INHALATION) EVERY 4 HOURS PRN
Qty: 150 ML | Refills: 0 | Status: SHIPPED | OUTPATIENT
Start: 2023-11-13 | End: 2023-11-14

## 2023-11-13 NOTE — ASSESSMENT & PLAN NOTE
She reports this started over 7 days ago. She has had chills, fever, and body aches. She believe she could be around someone sick due to her job. Denies nausea, vomiting, abdominal pain, diarrhea. She has a cough and coughing up sputum, but does not know what color. She puts vics on her chest to help breathe and ibuprofen. She still smokes. She has been using Anoro.     COVID/Flu negative    Prescribed tesslon perles, steroids, omnicef   Sample of breztri given , trelegy costs too much   Encouraged to monitor oxygen saturation

## 2023-11-13 NOTE — PATIENT INSTRUCTIONS
Follow-up 2 months annual physical- have to be fasting for that appointment   Nicotrol inhaler prescribed  Prescribed tesslon perles, steroid pack, omnicef  Samples of breztri given   Encourage to monitor oxygen saturation

## 2023-11-13 NOTE — PROGRESS NOTES
"Chief Complaint  Chief Complaint   Patient presents with    Cough    Generalized Body Aches    URI        Subjective          Elena Dangelo is a 63-year-old female who presentedto the office on 11/13/2023 with complaints of cough.    Cough- She reports this started over 7 days ago. She has had chills, fever, and body aches. She believe she could be around someone sick due to her job. Denies nausea, vomiting, abdominal pain, diarrhea. She has a cough and coughing up sputum, but does not know what color. She puts vics on her chest to help breathe and ibuprofen. She still smokes. She has been using Anoro.     Cough  Associated symptoms include chest pain, chills, ear pain, a fever, myalgias, a sore throat and shortness of breath.   URI   Associated symptoms include chest pain, congestion, coughing, ear pain and a sore throat. Pertinent negatives include no abdominal pain or nausea.        Review of Systems   Constitutional:  Positive for chills and fever.   HENT:  Positive for congestion, ear pain and sore throat.    Respiratory:  Positive for cough and shortness of breath.    Cardiovascular:  Positive for chest pain.   Gastrointestinal:  Negative for abdominal pain and nausea.   Genitourinary:  Negative for difficulty urinating.   Musculoskeletal:  Positive for myalgias.   Skin: Negative.    Neurological:  Positive for headache. Negative for dizziness and light-headedness.        Objective   Vital Signs:   Vitals:    11/13/23 1528   BP: 152/84   Pulse: 86   Temp: 98.4 °F (36.9 °C)   SpO2: 98%      Estimated body mass index is 27.45 kg/m² as calculated from the following:    Height as of this encounter: 149.9 cm (59.02\").    Weight as of this encounter: 61.7 kg (136 lb).          Physical Exam  Vitals reviewed.   Constitutional:       Appearance: Normal appearance. She is ill-appearing.   HENT:      Head: Normocephalic and atraumatic.      Ears:      Comments: Erythema noted bilateral ears      Nose: Nose normal.    "   Mouth/Throat:      Mouth: Mucous membranes are moist.      Pharynx: Oropharynx is clear.   Eyes:      Extraocular Movements: Extraocular movements intact.      Conjunctiva/sclera: Conjunctivae normal.   Cardiovascular:      Rate and Rhythm: Normal rate and regular rhythm.      Pulses: Normal pulses.      Heart sounds: Normal heart sounds.      Comments: S1, S2 audible  Pulmonary:      Effort: Pulmonary effort is normal.      Breath sounds: Normal breath sounds.      Comments: On room air   Expiratory wheezing with coarseness noted throughout all lobes   Abdominal:      General: Abdomen is flat.      Palpations: Abdomen is soft.   Musculoskeletal:         General: Normal range of motion.      Cervical back: Normal range of motion.   Skin:     General: Skin is warm and dry.   Neurological:      General: No focal deficit present.      Mental Status: She is alert and oriented to person, place, and time. Mental status is at baseline.   Psychiatric:         Mood and Affect: Mood normal.         Behavior: Behavior normal.         Thought Content: Thought content normal.         Judgment: Judgment normal.                Physical Exam   Result Review :             Procedures       Assessment and Plan     Diagnoses and all orders for this visit:    1. Generalized body aches (Primary)  -     POCT SARS-CoV-2 + Flu Antigen GLENIS    2. Cough, unspecified type  -     POCT SARS-CoV-2 + Flu Antigen GLENIS    3. Chest congestion  -     POCT SARS-CoV-2 + Flu Antigen GLENIS    4. COPD exacerbation  Assessment & Plan:  She reports this started over 7 days ago. She has had chills, fever, and body aches. She believe she could be around someone sick due to her job. Denies nausea, vomiting, abdominal pain, diarrhea. She has a cough and coughing up sputum, but does not know what color. She puts vics on her chest to help breathe and ibuprofen. She still smokes. She has been using Anoro.     COVID/Flu negative    Prescribed tesslon perles, steroids,  [de-identified] : Ms. Spears is a 35 y/o female who is here for chronic ITP. She was originally seen in 2016 when she was pregnant (), with planned  date of 16 at [facility]. Bella has a 12-year history of ITP which has responded reasonably well to steroids, though prednisone was not effective during her 1st pregnancy 2 years ago, and her tolerance was poor, with insomnia and personality change. She progressed well with her last pregnancy, and her only symptom of ITP has been minor bruising. Since her pregnancy, she has been asymptomatic and doing well.  omnicef   Sample of breztri given , trelegy costs too much   Encouraged to monitor oxygen saturation       5. Other emphysema  -     albuterol (ACCUNEB) 0.63 MG/3ML nebulizer solution; Take 3 mL by nebulization Every 4 (Four) Hours As Needed for Wheezing.  Dispense: 150 mL; Refill: 0    Other orders  -     Discontinue: nicotine (Nicotrol) 10 MG inhaler; Inhale 2 puffs As Needed for Smoking Cessation.  Dispense: 30 each; Refill: 0  -     benzonatate (TESSALON) 200 MG capsule; Take 1 capsule by mouth 3 (Three) Times a Day As Needed for Cough.  Dispense: 30 capsule; Refill: 0  -     cefdinir (OMNICEF) 300 MG capsule; Take 1 capsule by mouth 2 (Two) Times a Day.  Dispense: 14 capsule; Refill: 0              Follow Up   Return in about 2 months (around 1/13/2024) for Annual physical.   Patient was given instructions and counseling regarding her condition or for health maintenance advice. Please see specific information pulled into the AVS if appropriate.      [de-identified] : She is doing well and is asymptomatic. Her platelets today are 99K. \par She denies any bruising or bleeding. \par Reports having normal periods.\par \par 4/27/22: WBC 7.8 K/uL, HGB 13.2, HCT 39.3, PLT 99 K/uL

## 2023-11-14 ENCOUNTER — TELEPHONE (OUTPATIENT)
Dept: FAMILY MEDICINE CLINIC | Facility: CLINIC | Age: 63
End: 2023-11-14

## 2023-11-14 DIAGNOSIS — J43.8 OTHER EMPHYSEMA: ICD-10-CM

## 2023-11-14 PROBLEM — Z71.6 TOBACCO ABUSE COUNSELING: Status: ACTIVE | Noted: 2023-11-14

## 2023-11-14 RX ORDER — NICOTINE 4 MG/1
2 INHALANT RESPIRATORY (INHALATION) AS NEEDED
Qty: 168 EACH | Refills: 0 | Status: SHIPPED | OUTPATIENT
Start: 2023-11-14 | End: 2023-11-14 | Stop reason: SDUPTHER

## 2023-11-14 RX ORDER — NICOTINE 4 MG/1
2 INHALANT RESPIRATORY (INHALATION) AS NEEDED
Qty: 30 EACH | Refills: 0 | Status: SHIPPED | OUTPATIENT
Start: 2023-11-14 | End: 2023-11-14 | Stop reason: SDUPTHER

## 2023-11-14 RX ORDER — ALBUTEROL SULFATE 0.63 MG/3ML
1 SOLUTION RESPIRATORY (INHALATION) EVERY 4 HOURS PRN
Qty: 600 ML | Refills: 0 | Status: SHIPPED | OUTPATIENT
Start: 2023-11-14

## 2023-11-14 RX ORDER — NICOTINE 4 MG/1
2 INHALANT RESPIRATORY (INHALATION) AS NEEDED
Qty: 168 EACH | Refills: 0 | Status: SHIPPED | OUTPATIENT
Start: 2023-11-14

## 2023-11-14 RX ORDER — PREDNISONE 10 MG/1
TABLET ORAL
Qty: 48 TABLET | Refills: 0 | Status: SHIPPED | OUTPATIENT
Start: 2023-11-14

## 2023-11-14 NOTE — TELEPHONE ENCOUNTER
Caller: ALONSO SHOOK    Relationship: Emergency Contact    Best call back number: [phone]     What medication are you requesting:     albuterol (ACCUNEB) 0.63 MG/3ML nebulizer solution   PREDNISONE    Have you had these symptoms before:    [x] Yes  [] No    Have you been treated for these symptoms before:   [x] Yes  [] No    If a prescription is needed, what is your preferred pharmacy and phone number: [id number] - [facility] [address] - [phone] [facility] [phone]      Additional notes: PATIENT'S  STATES THAT THEY WENT TO  HER PRESCRIPTIONS, AND THE NEBULIZER PRESCRIPTION ONLY INCLUDED ENOUGH MEDICATION FOR 1 WEEK. REQUESTS TO INCREASE THE QUANTITY TO A 1 MONTH SUPPLY. HE ALSO MENTIONED THAT THEY HAD DISCUSSED PREDNISONE, BUT THE PHARMACY DID NOT HAVE THE PRESCRIPTION.

## 2023-11-14 NOTE — TELEPHONE ENCOUNTER
Caller: ALONSO SHOOK    Relationship: Emergency Contact    Best call back number: [phone]     Requested Prescriptions:   Requested Prescriptions     Pending Prescriptions Disp Refills    nicotine (Nicotrol) 10 MG inhaler 30 each 0     Sig: Inhale 2 puffs As Needed for Smoking Cessation.        Pharmacy where request should be sent: [facility]ACY #[id number] - [place] - [address] [facility] [address] - [phone]  - [phone]     Last office visit with prescribing clinician: 11/13/2023   Last telemedicine visit with prescribing clinician: Visit date not found   Next office visit with prescribing clinician: Visit date not found     Additional details provided by patient: PATIENT'S  STATES THAT SAVIR NO LONGER CARRIES THE MEDICATION, AND REQUESTS TO HAVE PRESCRIPTION SENT TO [facility] INSTEAD    Does the patient have less than a 3 day supply:  [x] Yes  [] No    Would you like a call back once the refill request has been completed: [] Yes [x] No    If the office needs to give you a call back, can they leave a voicemail: [] Yes [x] No    Alvaro Quiroz Rep   11/14/23 10:09 EST

## 2023-11-14 NOTE — ASSESSMENT & PLAN NOTE
Elena Spanngokulmay  reports that she has been smoking cigarettes. She has a 15.00 pack-year smoking history. She has never been exposed to tobacco smoke. She has never used smokeless tobacco.. I have educated her on the risk of diseases from using tobacco products such as cancer, COPD, and heart disease.     I advised her to quit and she is willing to quit. We have discussed the following method/s for tobacco cessation:  Counseling Prescription Medicaiton.  Together we have set a quit date for 3 weeks from today.  She will follow up with me in 6 month or sooner to check on her progress.    I spent 3  minutes counseling the patient.

## 2023-11-22 ENCOUNTER — TELEPHONE (OUTPATIENT)
Dept: FAMILY MEDICINE CLINIC | Facility: CLINIC | Age: 63
End: 2023-11-22

## 2023-11-22 RX ORDER — PREDNISONE 10 MG/1
TABLET ORAL
Qty: 48 TABLET | Refills: 0 | Status: SHIPPED | OUTPATIENT
Start: 2023-11-22

## 2023-11-22 NOTE — TELEPHONE ENCOUNTER
Caller: Elena Dangelo    Relationship to patient: Self    Best call back number: [phone]    Patient is needing:     PATIENT WAS SEEN ON 11/13 AND GIVEN MEDICATION, SHE HAS FINISHED THE MEDICATION AND SAID HER CHEST CONGESTION HAS CLEARED SOME BUT SHE IS CONCERNED IF SHE DOES NOT CONTINUE ON MEDICATION THAT SHE WILL CONTINUE TO BE CONGESTED AND GET WORSE AGAIN    PATIENT SAID HER CHEST IS STILL PRETTY TIGHT FROM THE PNEUMONIA AND COPD    PHARMACY: [id number] - [place] [id number] [facility] - [phone] [facility] [phone]  [phone]

## 2024-01-22 RX ORDER — ALBUTEROL SULFATE 90 UG/1
AEROSOL, METERED RESPIRATORY (INHALATION)
Qty: 8.5 G | Refills: 0 | Status: SHIPPED | OUTPATIENT
Start: 2024-01-22

## 2024-01-22 NOTE — TELEPHONE ENCOUNTER
Rx Refill Note  Requested Prescriptions     Pending Prescriptions Disp Refills    albuterol sulfate  (90 Base) MCG/ACT inhaler [Pharmacy Med Name: Albuterol Sulfate HFA Inhalation Aerosol Solution 108 (90 Base) MCG/ACT] 8.5 g 0     Sig: INHALE 2 PUFFS BY MOUTH EVERY FOUR HOURS AS NEEDED FOR WHEEZING OR SHORTNESS OF BREATH      Last office visit with prescribing clinician: 11/13/2023   Last telemedicine visit with prescribing clinician: Visit date not found   Next office visit with prescribing clinician: Visit date not found        Comprehensive Metabolic Panel (12/23/2022 09:47)                  Would you like a call back once the refill request has been completed: [] Yes [] No    If the office needs to give you a call back, can they leave a voicemail: [] Yes [] No    Shasta Roy, RT  01/22/24, 10:47 EST

## 2024-02-07 RX ORDER — FLUTICASONE FUROATE, UMECLIDINIUM BROMIDE AND VILANTEROL TRIFENATATE 200; 62.5; 25 UG/1; UG/1; UG/1
1 POWDER RESPIRATORY (INHALATION) DAILY
Qty: 180 EACH | Refills: 0 | Status: SHIPPED | OUTPATIENT
Start: 2024-02-07

## 2024-02-23 ENCOUNTER — TELEPHONE (OUTPATIENT)
Dept: FAMILY MEDICINE CLINIC | Facility: CLINIC | Age: 64
End: 2024-02-23
Payer: COMMERCIAL

## 2024-02-23 ENCOUNTER — OFFICE VISIT (OUTPATIENT)
Dept: FAMILY MEDICINE CLINIC | Facility: CLINIC | Age: 64
End: 2024-02-23
Payer: COMMERCIAL

## 2024-02-23 VITALS
DIASTOLIC BLOOD PRESSURE: 87 MMHG | WEIGHT: 136 LBS | SYSTOLIC BLOOD PRESSURE: 148 MMHG | OXYGEN SATURATION: 95 % | TEMPERATURE: 99.1 F | BODY MASS INDEX: 27.42 KG/M2 | HEIGHT: 59 IN | HEART RATE: 105 BPM

## 2024-02-23 DIAGNOSIS — J06.9 UPPER RESPIRATORY TRACT INFECTION, UNSPECIFIED TYPE: ICD-10-CM

## 2024-02-23 DIAGNOSIS — R52 BODY ACHES: ICD-10-CM

## 2024-02-23 DIAGNOSIS — J39.8 CONGESTION OF UPPER RESPIRATORY TRACT: ICD-10-CM

## 2024-02-23 DIAGNOSIS — R05.9 COUGH, UNSPECIFIED TYPE: ICD-10-CM

## 2024-02-23 DIAGNOSIS — R04.2 HEMOPTYSIS: Primary | ICD-10-CM

## 2024-02-23 DIAGNOSIS — R03.0 ELEVATED BLOOD PRESSURE READING: ICD-10-CM

## 2024-02-23 PROCEDURE — 85025 COMPLETE CBC W/AUTO DIFF WBC: CPT | Performed by: NURSE PRACTITIONER

## 2024-02-23 PROCEDURE — 80053 COMPREHEN METABOLIC PANEL: CPT | Performed by: NURSE PRACTITIONER

## 2024-02-23 RX ORDER — ALBUTEROL SULFATE 90 UG/1
1 AEROSOL, METERED RESPIRATORY (INHALATION) EVERY 6 HOURS PRN
Qty: 8 G | Refills: 3 | Status: SHIPPED | OUTPATIENT
Start: 2024-02-23 | End: 2024-02-23 | Stop reason: SDUPTHER

## 2024-02-23 RX ORDER — ALBUTEROL SULFATE 90 UG/1
1 AEROSOL, METERED RESPIRATORY (INHALATION) EVERY 6 HOURS PRN
Qty: 8 G | Refills: 3 | Status: SHIPPED | OUTPATIENT
Start: 2024-02-23

## 2024-02-23 RX ORDER — HYDROCODONE POLISTIREX AND CHLORPHENIRAMINE POLISTIREX 10; 8 MG/5ML; MG/5ML
5 SUSPENSION, EXTENDED RELEASE ORAL EVERY 12 HOURS PRN
Qty: 70 ML | Refills: 0 | Status: SHIPPED | OUTPATIENT
Start: 2024-02-23 | End: 2024-02-23 | Stop reason: SDUPTHER

## 2024-02-23 RX ORDER — FLUCONAZOLE 150 MG/1
150 TABLET ORAL ONCE
Qty: 1 TABLET | Refills: 0 | Status: SHIPPED | OUTPATIENT
Start: 2024-02-23 | End: 2024-02-23

## 2024-02-23 RX ORDER — DOXYCYCLINE HYCLATE 100 MG/1
100 CAPSULE ORAL 2 TIMES DAILY
Qty: 14 CAPSULE | Refills: 0 | Status: SHIPPED | OUTPATIENT
Start: 2024-02-23

## 2024-02-23 RX ORDER — HYDROCODONE POLISTIREX AND CHLORPHENIRAMINE POLISTIREX 10; 8 MG/5ML; MG/5ML
5 SUSPENSION, EXTENDED RELEASE ORAL EVERY 12 HOURS PRN
Qty: 70 ML | Refills: 0 | Status: SHIPPED | OUTPATIENT
Start: 2024-02-23

## 2024-02-23 RX ORDER — CEFDINIR 300 MG/1
300 CAPSULE ORAL 2 TIMES DAILY
Qty: 14 CAPSULE | Refills: 0 | Status: SHIPPED | OUTPATIENT
Start: 2024-02-23

## 2024-02-23 RX ORDER — PREDNISONE 10 MG/1
TABLET ORAL
Qty: 48 TABLET | Refills: 0 | Status: SHIPPED | OUTPATIENT
Start: 2024-02-23

## 2024-02-23 RX ORDER — CEFDINIR 300 MG/1
300 CAPSULE ORAL 2 TIMES DAILY
Qty: 14 CAPSULE | Refills: 0 | Status: SHIPPED | OUTPATIENT
Start: 2024-02-23 | End: 2024-02-23 | Stop reason: SDUPTHER

## 2024-02-23 NOTE — TELEPHONE ENCOUNTER
HUB TO RELAY      Prescription for Tussionex is ready for pickup at the office, Hours are M-F 8am-430p

## 2024-02-23 NOTE — TELEPHONE ENCOUNTER
Name: LoretoSunnyna    Relationship: Self    Best Callback Number: [phone]     HUB PROVIDED THE RELAY MESSAGE FROM THE OFFICE   PATIENT VOICED UNDERSTANDING AND HAS NO FURTHER QUESTIONS AT THIS TIME

## 2024-02-23 NOTE — PROGRESS NOTES
Venipuncture performed in left arm by Shraddha Hadley MA with good hemostasis. Patient tolerated well. Shraddha Hadley MA 04/14/23

## 2024-02-23 NOTE — PROGRESS NOTES
"Chief Complaint  Chief Complaint   Patient presents with    Cough     SOS a week ago     Coughing Up Blood    Fever    Generalized Body Aches    Chest Pain        Subjective          Elena Dangelo is a 63 year old female who presents to the office today with complaints of coughing up blood/COPD.    Coughing up blood/COPD- She reports Saturday/Sunday she was coughing up mucus with plum colored, the last few times it was raw blood. Her chest has been hurting and she was short of breath last night. She has had fever and body aches. She has been having wheezing. She reports this all started Saturday night. She reports she has low back pain into right lung on Saturday. She did a breathing treatment and laid down. She reports her left arm felt asleep. She reports both her arms were getting heavy.          Review of Systems   Constitutional:  Positive for chills and fever.   HENT:  Positive for congestion and sore throat.    Respiratory:  Positive for cough and shortness of breath.    Cardiovascular:  Positive for chest pain.   Gastrointestinal:  Negative for abdominal pain, nausea and vomiting.   Genitourinary:  Negative for difficulty urinating.   Musculoskeletal:  Positive for myalgias.   Skin: Negative.    Neurological:  Positive for headache. Negative for dizziness and light-headedness.        Objective   Vital Signs:   Vitals:    02/23/24 1141   BP: 148/87   Pulse:    Temp:    SpO2:       Estimated body mass index is 27.45 kg/m² as calculated from the following:    Height as of this encounter: 149.9 cm (59.02\").    Weight as of this encounter: 61.7 kg (136 lb).            Physical Exam  Vitals reviewed.   Constitutional:       Appearance: She is normal weight. She is ill-appearing.   HENT:      Head: Normocephalic and atraumatic.      Nose: Nose normal. Congestion present.      Mouth/Throat:      Mouth: Mucous membranes are moist.      Pharynx: Oropharynx is clear.   Eyes:      Extraocular Movements: Extraocular " movements intact.      Conjunctiva/sclera: Conjunctivae normal.   Cardiovascular:      Rate and Rhythm: Normal rate and regular rhythm.      Pulses: Normal pulses.      Heart sounds: Normal heart sounds.      Comments: S1, S2 audible  Pulmonary:      Effort: Pulmonary effort is normal.      Breath sounds: Normal breath sounds.      Comments: On room air, expiratory wheezing noted throughout all lobes   Abdominal:      General: Abdomen is flat.   Musculoskeletal:         General: Normal range of motion.      Cervical back: Normal range of motion.   Skin:     General: Skin is warm and dry.   Neurological:      General: No focal deficit present.      Mental Status: She is alert and oriented to person, place, and time. Mental status is at baseline.   Psychiatric:         Mood and Affect: Mood normal.         Behavior: Behavior normal.         Thought Content: Thought content normal.         Judgment: Judgment normal.                Physical Exam   Result Review :             Procedures       Assessment and Plan     Diagnoses and all orders for this visit:    1. Hemoptysis (Primary)  Assessment & Plan:  Coughing up blood/COPD- She reports Saturday/Sunday she was coughing up mucus with plum colored, the last few times it was raw blood. Her chest has been hurting and she was short of breath last night. She has had fever and body aches. She has been having wheezing. She reports this all started Saturday night. She reports she has low back pain into right lung on Saturday. She did a breathing treatment and laid down. She reports her left arm felt asleep. She reports both her arms were getting heavy. She reports it hurts to take a deep breath.     Likely pneumonia- cannot rule out other etiologies     She is on trelegy 200mcg   Sats 95% in office on room air  Expiratory wheezing noted     COVID/Flu negative    CXR ordered  Check CBC and CMP   Prescribed omnicef, doxycycline, tussionex and prednisone dose pack     Orders:  -      XR Chest PA & Lateral (In Office)  -     CBC & Differential  -     Comprehensive metabolic panel; Future  -     Comprehensive metabolic panel    2. Upper respiratory tract infection, unspecified type  Assessment & Plan:  Coughing up blood/COPD- She reports Saturday/Sunday she was coughing up mucus with plum colored, the last few times it was raw blood. Her chest has been hurting and she was short of breath last night. She has had fever and body aches. She has been having wheezing. She reports this all started Saturday night. She reports she has low back pain into right lung on Saturday. She did a breathing treatment and laid down. She reports her left arm felt asleep. She reports both her arms were getting heavy.     Prescribed steroids, omnicef, doxycycline, tussionex,and re-filled albuterol       3. Congestion of upper respiratory tract  -     Hydrocod James-Chlorphe James ER (TUSSIONEX PENNKINETIC) 10-8 MG/5ML ER suspension; Take 5 mL by mouth Every 12 (Twelve) Hours As Needed for Cough.  Dispense: 70 mL; Refill: 0    4. Cough, unspecified type  -     POCT SARS-CoV-2 + Flu Antigen GLENIS    5. Body aches  -     POCT SARS-CoV-2 + Flu Antigen GLENIS    6. Elevated blood pressure reading  Assessment & Plan:      She has had some chest pain and shortness of breath, headache. Denies dizziness or lightheadedness     Keep BP log       Other orders  -     cefdinir (OMNICEF) 300 MG capsule; Take 1 capsule by mouth 2 (Two) Times a Day.  Dispense: 14 capsule; Refill: 0  -     doxycycline (VIBRAMYCIN) 100 MG capsule; Take 1 capsule by mouth 2 (Two) Times a Day.  Dispense: 14 capsule; Refill: 0  -     albuterol sulfate  (90 Base) MCG/ACT inhaler; Inhale 1 puff Every 6 (Six) Hours As Needed for Wheezing.  Dispense: 8 g; Refill: 3              Follow Up   Return for Annual physical.   Patient was given instructions and counseling regarding her condition or for health maintenance advice. Please see specific information pulled  into the AVS if appropriate.

## 2024-02-23 NOTE — ASSESSMENT & PLAN NOTE
She has had some chest pain and shortness of breath, headache. Denies dizziness or lightheadedness     Keep BP log

## 2024-02-23 NOTE — ASSESSMENT & PLAN NOTE
Coughing up blood/COPD- She reports Saturday/Sunday she was coughing up mucus with plum colored, the last few times it was raw blood. Her chest has been hurting and she was short of breath last night. She has had fever and body aches. She has been having wheezing. She reports this all started Saturday night. She reports she has low back pain into right lung on Saturday. She did a breathing treatment and laid down. She reports her left arm felt asleep. She reports both her arms were getting heavy.     Prescribed steroids, omnicef, doxycycline, tussionex,and re-filled albuterol

## 2024-02-23 NOTE — ASSESSMENT & PLAN NOTE
Coughing up blood/COPD- She reports Saturday/Sunday she was coughing up mucus with plum colored, the last few times it was raw blood. Her chest has been hurting and she was short of breath last night. She has had fever and body aches. She has been having wheezing. She reports this all started Saturday night. She reports she has low back pain into right lung on Saturday. She did a breathing treatment and laid down. She reports her left arm felt asleep. She reports both her arms were getting heavy. She reports it hurts to take a deep breath.     Likely pneumonia- cannot rule out other etiologies     She is on trelegy 200mcg   Sats 95% in office on room air  Expiratory wheezing noted     COVID/Flu negative    CXR ordered  Check CBC and CMP   Prescribed omnicef, doxycycline, tussionex and prednisone dose pack

## 2024-02-24 LAB
ALBUMIN SERPL-MCNC: 3.5 G/DL (ref 3.5–5.2)
ALBUMIN/GLOB SERPL: 1.1 G/DL
ALP SERPL-CCNC: 112 U/L (ref 39–117)
ALT SERPL W P-5'-P-CCNC: 8 U/L (ref 1–33)
ANION GAP SERPL CALCULATED.3IONS-SCNC: 13.2 MMOL/L (ref 5–15)
AST SERPL-CCNC: 16 U/L (ref 1–32)
BASOPHILS # BLD AUTO: 0.09 10*3/MM3 (ref 0–0.2)
BASOPHILS NFR BLD AUTO: 0.7 % (ref 0–1.5)
BILIRUB SERPL-MCNC: <0.2 MG/DL (ref 0–1.2)
BUN SERPL-MCNC: 10 MG/DL (ref 8–23)
BUN/CREAT SERPL: 21.3 (ref 7–25)
CALCIUM SPEC-SCNC: 8.9 MG/DL (ref 8.6–10.5)
CHLORIDE SERPL-SCNC: 100 MMOL/L (ref 98–107)
CO2 SERPL-SCNC: 24.8 MMOL/L (ref 22–29)
CREAT SERPL-MCNC: 0.47 MG/DL (ref 0.57–1)
DEPRECATED RDW RBC AUTO: 37.1 FL (ref 37–54)
EGFRCR SERPLBLD CKD-EPI 2021: 106.5 ML/MIN/1.73
EOSINOPHIL # BLD AUTO: 0.12 10*3/MM3 (ref 0–0.4)
EOSINOPHIL NFR BLD AUTO: 0.9 % (ref 0.3–6.2)
ERYTHROCYTE [DISTWIDTH] IN BLOOD BY AUTOMATED COUNT: 13.2 % (ref 12.3–15.4)
GLOBULIN UR ELPH-MCNC: 3.2 GM/DL
GLUCOSE SERPL-MCNC: 100 MG/DL (ref 65–99)
HCT VFR BLD AUTO: 37 % (ref 34–46.6)
HGB BLD-MCNC: 11.9 G/DL (ref 12–15.9)
IMM GRANULOCYTES # BLD AUTO: 0.06 10*3/MM3 (ref 0–0.05)
IMM GRANULOCYTES NFR BLD AUTO: 0.5 % (ref 0–0.5)
LYMPHOCYTES # BLD AUTO: 1.39 10*3/MM3 (ref 0.7–3.1)
LYMPHOCYTES NFR BLD AUTO: 10.8 % (ref 19.6–45.3)
MCH RBC QN AUTO: 25.6 PG (ref 26.6–33)
MCHC RBC AUTO-ENTMCNC: 32.2 G/DL (ref 31.5–35.7)
MCV RBC AUTO: 79.6 FL (ref 79–97)
MONOCYTES # BLD AUTO: 0.83 10*3/MM3 (ref 0.1–0.9)
MONOCYTES NFR BLD AUTO: 6.4 % (ref 5–12)
NEUTROPHILS NFR BLD AUTO: 10.39 10*3/MM3 (ref 1.7–7)
NEUTROPHILS NFR BLD AUTO: 80.7 % (ref 42.7–76)
NRBC BLD AUTO-RTO: 0 /100 WBC (ref 0–0.2)
PLATELET # BLD AUTO: 344 10*3/MM3 (ref 140–450)
PMV BLD AUTO: 10.4 FL (ref 6–12)
POTASSIUM SERPL-SCNC: 4.2 MMOL/L (ref 3.5–5.2)
PROT SERPL-MCNC: 6.7 G/DL (ref 6–8.5)
RBC # BLD AUTO: 4.65 10*6/MM3 (ref 3.77–5.28)
SODIUM SERPL-SCNC: 138 MMOL/L (ref 136–145)
WBC NRBC COR # BLD AUTO: 12.88 10*3/MM3 (ref 3.4–10.8)

## 2024-02-26 ENCOUNTER — TELEPHONE (OUTPATIENT)
Dept: FAMILY MEDICINE CLINIC | Facility: CLINIC | Age: 64
End: 2024-02-26
Payer: COMMERCIAL

## 2024-02-26 DIAGNOSIS — R93.89 ABNORMAL CXR (CHEST X-RAY): Primary | ICD-10-CM

## 2024-02-26 NOTE — TELEPHONE ENCOUNTER
On 2/26/2024 at 7:55 AM I called patient's with results of labs and chest x-ray.  I discussed these results with her and told her radiologist is recommending a CT scan of her chest.  I ordered the CT scan of her chest.  Patient understood.    Electronically signed by MINOO Reeves, 02/26/24, 7:59 AM EST.

## 2024-02-29 ENCOUNTER — TELEPHONE (OUTPATIENT)
Dept: FAMILY MEDICINE CLINIC | Facility: CLINIC | Age: 64
End: 2024-02-29
Payer: COMMERCIAL

## 2024-02-29 NOTE — TELEPHONE ENCOUNTER
Doxycycline rx was not printed out and Gabby out of the office today.  I called this medication into [facility] pharmacy.  I am unable to call in the tussionex.

## 2024-02-29 NOTE — TELEPHONE ENCOUNTER
Caller: ALONSO SHOOK    Relationship: Emergency Contact    Best call back number:[phone]         What was the call regarding: PATIENT IS CHECKING STATUS OF THE CT SCAN. THEY HAVE BEEN WAITING SINCE MONDAY 2/26    ALSO THE COUGH MEDICATION WAS NEVER RECEIVED AT PHARMACY DUE TO [employer] BEING DOWN. THEY ARE NEEDING DIRECTION AS TO HOW TO GET IT.       [id number] - [facility] [address] [facility] - [phone] [facility] [phone]  [phone]

## 2024-02-29 NOTE — TELEPHONE ENCOUNTER
Pt  will be coming to pickup Tussionex Rx. Also, did not get the Rx for Doxy, and will need to pickup that as well.     She will wait for [facility] schedulers to call for CT scheduling.

## 2024-03-05 ENCOUNTER — TELEPHONE (OUTPATIENT)
Dept: FAMILY MEDICINE CLINIC | Facility: CLINIC | Age: 64
End: 2024-03-05
Payer: COMMERCIAL

## 2024-03-05 DIAGNOSIS — J44.1 COPD EXACERBATION: Primary | ICD-10-CM

## 2024-03-05 NOTE — TELEPHONE ENCOUNTER
Caller: Elena Dangelo    Relationship: Self    Best call back number: [phone]    What medication are you requesting: SUBSTITUTE FOR DOXYCYCLINE    What are your current symptoms:     How long have you been experiencing symptoms:     Have you had these symptoms before:    [x] Yes  [] No    Have you been treated for these symptoms before:   [x] Yes  [] No    If a prescription is needed, what is your preferred pharmacy and phone number: [id number] - [place] [id number] [facility] - [phone]  - [phone]      Additional notes:

## 2024-03-05 NOTE — TELEPHONE ENCOUNTER
I called patient on 3/5/2024.  She reports she cannot tolerate the doxycycline and wanted to know if I wanted to call in another antibiotic.  I asked her if her repeat wheezing has improved and she states she still does not feel good.  I told her to hold off on another antibiotic for now.  I placed a pulmonology referral for her.  She understood.  Her COPD has progressively got worse.    Electronically signed by MINOO Reeves, 03/05/24, 3:27 PM EST.

## 2024-03-12 DIAGNOSIS — J43.8 OTHER EMPHYSEMA: ICD-10-CM

## 2024-03-12 RX ORDER — FLUTICASONE FUROATE, UMECLIDINIUM BROMIDE AND VILANTEROL TRIFENATATE 200; 62.5; 25 UG/1; UG/1; UG/1
1 POWDER RESPIRATORY (INHALATION) DAILY
Qty: 180 EACH | Refills: 0 | Status: SHIPPED | OUTPATIENT
Start: 2024-03-12

## 2024-03-12 RX ORDER — ALBUTEROL SULFATE 0.63 MG/3ML
1 SOLUTION RESPIRATORY (INHALATION) EVERY 4 HOURS PRN
Qty: 600 ML | Refills: 0 | Status: SHIPPED | OUTPATIENT
Start: 2024-03-12

## 2024-03-12 RX ORDER — ALBUTEROL SULFATE 90 UG/1
1 AEROSOL, METERED RESPIRATORY (INHALATION) EVERY 6 HOURS PRN
Qty: 8 G | Refills: 3 | Status: SHIPPED | OUTPATIENT
Start: 2024-03-12

## 2024-03-12 NOTE — TELEPHONE ENCOUNTER
Caller: Elena Dangelo    Relationship: Self    Best call back number: [phone]     Requested Prescriptions:   Requested Prescriptions     Pending Prescriptions Disp Refills    albuterol (ACCUNEB) 0.63 MG/3ML nebulizer solution 600 mL 0     Sig: Take 3 mL by nebulization Every 4 (Four) Hours As Needed for Wheezing.    Fluticasone-Umeclidin-Vilant (Trelegy Ellipta) 200-62.5-25 MCG/ACT inhaler 180 each 0     Sig: Inhale 1 puff Daily.    albuterol sulfate  (90 Base) MCG/ACT inhaler 8 g 3     Sig: Inhale 1 puff Every 6 (Six) Hours As Needed for Wheezing.        Pharmacy where request should be sent: [id number] [address] [facility] - [phone] [facility] [phone] FX     Last office visit with prescribing clinician: 2/23/2024   Last telemedicine visit with prescribing clinician: Visit date not found   Next office visit with prescribing clinician: 4/23/2024     Additional details provided by patient:     Does the patient have less than a 3 day supply:  [x] Yes  [] No    Would you like a call back once the refill request has been completed: [] Yes [] No    If the office needs to give you a call back, can they leave a voicemail: [] Yes [] No    Alvaro Multani Rep   03/12/24 10:21 EDT

## 2024-03-12 NOTE — TELEPHONE ENCOUNTER
Rx Refill Note  Requested Prescriptions     Pending Prescriptions Disp Refills    albuterol (ACCUNEB) 0.63 MG/3ML nebulizer solution 600 mL 0     Sig: Take 3 mL by nebulization Every 4 (Four) Hours As Needed for Wheezing.    Fluticasone-Umeclidin-Vilant (Trelegy Ellipta) 200-62.5-25 MCG/ACT inhaler 180 each 0     Sig: Inhale 1 puff Daily.    albuterol sulfate  (90 Base) MCG/ACT inhaler 8 g 3     Sig: Inhale 1 puff Every 6 (Six) Hours As Needed for Wheezing.      Last office visit with prescribing clinician: 2/23/2024   Last telemedicine visit with prescribing clinician: Visit date not found   Next office visit with prescribing clinician: 4/23/2024        Comprehensive metabolic panel (02/23/2024 11:41)                  Would you like a call back once the refill request has been completed: [] Yes [] No    If the office needs to give you a call back, can they leave a voicemail: [] Yes [] No    Shasta Roy, RT  03/12/24, 10:26 EDT

## 2024-04-04 ENCOUNTER — HOSPITAL ENCOUNTER (OUTPATIENT)
Dept: CT IMAGING | Facility: HOSPITAL | Age: 64
Discharge: HOME | End: 2024-04-04
Admitting: NURSE PRACTITIONER
Payer: COMMERCIAL

## 2024-04-04 DIAGNOSIS — R93.89 ABNORMAL CXR (CHEST X-RAY): ICD-10-CM

## 2024-04-04 LAB
CREAT BLDA-MCNC: 0.6 MG/DL (ref 0.6–1.3)
EGFRCR SERPLBLD CKD-EPI 2021: 99.8 ML/MIN/1.73

## 2024-04-04 PROCEDURE — 82565 ASSAY OF CREATININE: CPT

## 2024-04-04 PROCEDURE — 71260 CT THORAX DX C+: CPT

## 2024-04-04 PROCEDURE — 25510000001 IOPAMIDOL 61 % SOLUTION: Performed by: NURSE PRACTITIONER

## 2024-04-04 RX ADMIN — IOPAMIDOL 100 ML: 612 INJECTION, SOLUTION INTRAVENOUS at 16:46

## 2024-04-05 ENCOUNTER — TELEPHONE (OUTPATIENT)
Dept: FAMILY MEDICINE CLINIC | Facility: CLINIC | Age: 64
End: 2024-04-05
Payer: COMMERCIAL

## 2024-04-05 NOTE — TELEPHONE ENCOUNTER
I called patient on 4/5/2024 with results of CT scan.  I made her aware that she has chronic bronchitis as well as severe emphysema.  She reports she has been aware of this previously.  She reports she does have a pulmonology set appointment on May 15.    Electronically signed by MINOO Reeves, 04/05/24, 11:39 AM EDT.

## 2024-04-15 ENCOUNTER — OFFICE VISIT (OUTPATIENT)
Dept: PULMONOLOGY | Facility: HOSPITAL | Age: 64
End: 2024-04-15
Payer: COMMERCIAL

## 2024-04-15 VITALS
HEIGHT: 59 IN | DIASTOLIC BLOOD PRESSURE: 74 MMHG | RESPIRATION RATE: 14 BRPM | HEART RATE: 90 BPM | OXYGEN SATURATION: 93 % | WEIGHT: 142 LBS | BODY MASS INDEX: 28.63 KG/M2 | SYSTOLIC BLOOD PRESSURE: 121 MMHG

## 2024-04-15 DIAGNOSIS — I10 ESSENTIAL HYPERTENSION: ICD-10-CM

## 2024-04-15 DIAGNOSIS — Z72.0 TOBACCO ABUSE: Primary | ICD-10-CM

## 2024-04-15 DIAGNOSIS — J43.1 PANLOBULAR EMPHYSEMA: ICD-10-CM

## 2024-04-15 PROCEDURE — G0463 HOSPITAL OUTPT CLINIC VISIT: HCPCS

## 2024-04-15 RX ORDER — DOXYCYCLINE HYCLATE 100 MG
100 TABLET ORAL 2 TIMES DAILY
Qty: 20 TABLET | Refills: 0 | Status: SHIPPED | OUTPATIENT
Start: 2024-04-15

## 2024-04-15 RX ORDER — IPRATROPIUM BROMIDE AND ALBUTEROL SULFATE 2.5; .5 MG/3ML; MG/3ML
3 SOLUTION RESPIRATORY (INHALATION) 4 TIMES DAILY PRN
Qty: 120 ML | Refills: 11 | Status: SHIPPED | OUTPATIENT
Start: 2024-04-15 | End: 2024-05-15

## 2024-04-15 NOTE — PROGRESS NOTES
SLEEP/PULMONARY  CLINIC NOTE      PATIENT IDENTIFICATION:  Name: Elena Dangelo  Age: 64 y.o.  Sex: female  :  1960  MRN: [id number]    DATE OF CONSULTATION:  4/15/2024                     CHIEF COMPLAINT: Chronic obstructive airway disease    History of Present Illness:   Elena Dangelo is a 64 y.o. female current smoker with chronic obstructive airway disease still have significant   coughing sputum's currently is yellow, and significant dyspnea exertion using her inhalers without any benefit, increased tiredness fatigue no abscesses no fever no chills  Patient has a CAT scan was done this month did not show any nodule or masses but chronic changes  Patient with snoring at night daytime tiredness fatigues and sleepiness      Review of Systems:   Constitutional: As above   Eyes: negative   ENT/oropharynx: negative   Cardiovascular: negative   Respiratory: As above   Gastrointestinal: negative   Genitourinary: negative   Neurological: negative   Musculoskeletal: negative   Integument/breast: negative   Endocrine: negative   Allergic/Immunologic: negative     Past Medical History:  Past Medical History:   Diagnosis Date    COPD (chronic obstructive pulmonary disease)        Past Surgical History:  Past Surgical History:   Procedure Laterality Date    CHOLECYSTECTOMY      TUBAL ABDOMINAL LIGATION          Family History:  Family History   Problem Relation Age of Onset    Hypertension Mother     Hypertension Father     Hypertension Brother         Social History:   Social History     Tobacco Use    Smoking status: Every Day     Current packs/day: 1.00     Average packs/day: 1 pack/day for 15.0 years (15.0 ttl pk-yrs)     Types: Cigarettes     Passive exposure: Current    Smokeless tobacco: Never   Substance Use Topics    Alcohol use: Never        Allergies:  Allergies   Allergen Reactions    Azithromycin Anaphylaxis    Naproxen Anaphylaxis    Penicillin G Swelling    Penicillins Anaphylaxis and Swelling     Codeine Nausea And Vomiting       Home Meds:  (Not in a hospital admission)      Objective:    Vitals Ranges:   Heart Rate:  [90] 90  Resp:  [14] 14  BP: (121)/(74) 121/74  Body mass index is 28.66 kg/m².     Exam:  General Appearance:  WDWN    HEENT:   without obvious abnormality,  Conjunctiva/corneas clear,  Normal external ear canals, no drainage    Clear orsalmucosa,  Mallampati score 3    Neck:  Supple, symmetrical, trachea midline. No JVD.  Lungs:   Bilateral basal rhonchi bilaterally, respirations unlabored symmetrical wall movement.    Chest wall:  No tenderness or deformity.    Heart:  Regular rate and rhythm, S1 and S2 normal.  Extremities: Trace edema no clubbing or Cyanosis        Data Review:  All labs (24hrs): No results found for this or any previous visit (from the past 24 hour(s)).     Imaging:  CT Chest With Contrast Diagnostic  Narrative: CT CHEST W CONTRAST DIAGNOSTIC    Date of Exam: 4/4/2024 4:32 PM EDT    Indication: abnormal CXR.    Comparison: Chest x-ray 2/23/2024, CT chest 11/22/2021    Technique: Axial CT images were obtained of the chest after the uneventful intravenous administration of 100 cc Isovue-370.  Sagittal and coronal reconstructions were performed.  Automated exposure control and iterative reconstruction methods were used.    Findings:  MEDIASTINUM: Unremarkable. Aortic and heart size are normal. No mass nor pericardial effusion.  CORONARY ARTERIES: There is calcified atherosclerotic disease.  LUNGS: There there is mild diffuse bronchial wall thickening, right lung greater than left suggestive of bronchitis, likely chronic. There are irregular areas of parenchymal scarring/irregular opacities within the right lung consistent with chronic   infectious/inflammatory process. There is in the right upper lobe are similar if not slightly diminished. Areas in the right lower lobe are new. No new dense consolidation or suspicious nodule. There is moderate to severe emphysema,  unchanged.  PLEURAL SPACE: No effusion, mass, nor pneumothorax.  LYMPH NODES: There are no pathologically enlarged lymph nodes.    UPPER ABDOMEN: Unremarkable    OSSEOUS STRUCTURES: Appropriate for age with no acute process identified.  Impression: Impression:  1.Ongoing chronic inflammatory/infectious changes in the right lung without suspicious nodule/mass identified.  2.Emphysema.    Electronically Signed: Diego Moraes MD    4/5/2024 10:21 AM EDT    Workstation ID: [id number]       ASSESSMENT:  Diagnoses and all orders for this visit:    Tobacco abuse    Essential hypertension    Panlobular emphysema  -     Miscellaneous DME    Other orders  -     ipratropium-albuterol (DUO-NEB) 0.5-2.5 mg/3 ml nebulizer; Take 3 mL by nebulization 4 (Four) Times a Day As Needed for Wheezing for up to 30 days.  -     doxycycline (VIBRAMYICN) 100 MG tablet; Take 1 tablet by mouth 2 (Two) Times a Day.    Obstructive apnea    PLAN:  Will add antibiotics, encouraged to use her nebulizer more frequently now  Education patient  About tips for  smoking cessation and risk factors and benefit, was to a discussion with the patient.   Bronchodilator inhaled corticosteroid    Education how to use inhalers    Encouraged to use incentive spirometer    Continue to exercise slowly as tolerated    Monitor for any change in the color of the sputum    Avoid any exposure to fumes, gas or any irritant    This is patient with symptoms of obstructive sleep apnea, NPSG study ASAP / split night study, Avoid supine avoid sedative meds in pm, weight loss, Avoid driving. Long discussion with patient about the physiology of ANGIE, and long term and short term   benefit of treating ANGIE     Treating ANGIE will improve BP control       Follow-up 3 weeks    Lydia Mo MD. D, ABSM.  4/15/2024  15:04 EDT

## 2024-04-23 DIAGNOSIS — J43.8 OTHER EMPHYSEMA: ICD-10-CM

## 2024-04-23 RX ORDER — FLUTICASONE FUROATE, UMECLIDINIUM BROMIDE AND VILANTEROL TRIFENATATE 200; 62.5; 25 UG/1; UG/1; UG/1
1 POWDER RESPIRATORY (INHALATION) DAILY
Qty: 180 EACH | Refills: 0 | Status: SHIPPED | OUTPATIENT
Start: 2024-04-23

## 2024-04-23 RX ORDER — ALBUTEROL SULFATE 0.63 MG/3ML
SOLUTION RESPIRATORY (INHALATION)
Qty: 600 ML | Refills: 0 | Status: SHIPPED | OUTPATIENT
Start: 2024-04-23

## 2024-04-23 NOTE — TELEPHONE ENCOUNTER
Rx Refill Note  Requested Prescriptions     Pending Prescriptions Disp Refills    Trelegy Ellipta 200-62.5-25 MCG/ACT inhaler [Pharmacy Med Name: Trelegy Ellipta Inhalation Aerosol Powder Breath Activated 200-62.5-25 MCG/ACT] 180 each 0     Sig: INHALE 1 PUFF BY MOUTH EVERY DAY    albuterol (ACCUNEB) 0.63 MG/3ML nebulizer solution [Pharmacy Med Name: Albuterol Sulfate Inhalation Nebulization Solution 0.63 MG/3ML] 600 mL 0     Sig: INHALE 1 VIAL BY MOUTH VIA NEBULIZER EVERY 4 HOURS AS NEEDED FOR WHEEZING.      Last office visit with prescribing clinician: 2/23/2024   Last telemedicine visit with prescribing clinician: Visit date not found   Next office visit with prescribing clinician: Visit date not found        Comprehensive metabolic panel (02/23/2024 11:41)                  Would you like a call back once the refill request has been completed: [] Yes [] No    If the office needs to give you a call back, can they leave a voicemail: [] Yes [] No    Shasta Roy, RT  04/23/24, 09:54 EDT

## 2024-04-25 ENCOUNTER — TELEPHONE (OUTPATIENT)
Dept: PULMONOLOGY | Facility: HOSPITAL | Age: 64
End: 2024-04-25
Payer: COMMERCIAL

## 2024-05-09 ENCOUNTER — OFFICE VISIT (OUTPATIENT)
Dept: PULMONOLOGY | Facility: HOSPITAL | Age: 64
End: 2024-05-09
Payer: COMMERCIAL

## 2024-05-09 VITALS
HEIGHT: 59 IN | HEART RATE: 97 BPM | WEIGHT: 143 LBS | DIASTOLIC BLOOD PRESSURE: 78 MMHG | OXYGEN SATURATION: 92 % | BODY MASS INDEX: 28.83 KG/M2 | RESPIRATION RATE: 14 BRPM | SYSTOLIC BLOOD PRESSURE: 122 MMHG

## 2024-05-09 DIAGNOSIS — J41.1 MUCOPURULENT CHRONIC BRONCHITIS: ICD-10-CM

## 2024-05-09 DIAGNOSIS — R91.1 SOLITARY LUNG NODULE: ICD-10-CM

## 2024-05-09 DIAGNOSIS — J43.1 PANLOBULAR EMPHYSEMA: ICD-10-CM

## 2024-05-09 DIAGNOSIS — J43.8 OTHER EMPHYSEMA: ICD-10-CM

## 2024-05-09 DIAGNOSIS — Z72.0 TOBACCO ABUSE: Primary | ICD-10-CM

## 2024-05-09 PROCEDURE — G0463 HOSPITAL OUTPT CLINIC VISIT: HCPCS

## 2024-05-09 RX ORDER — PREDNISONE 10 MG/1
TABLET ORAL
Qty: 31 TABLET | Refills: 0 | Status: SHIPPED | OUTPATIENT
Start: 2024-05-09

## 2024-05-09 RX ORDER — ALBUTEROL SULFATE 90 UG/1
2 AEROSOL, METERED RESPIRATORY (INHALATION) EVERY 4 HOURS PRN
Qty: 6.7 G | Refills: 5 | Status: SHIPPED | OUTPATIENT
Start: 2024-05-09

## 2024-05-09 RX ORDER — LEVOFLOXACIN 500 MG/1
500 TABLET, FILM COATED ORAL DAILY
Qty: 7 TABLET | Refills: 0 | Status: SHIPPED | OUTPATIENT
Start: 2024-05-09

## 2024-06-07 DIAGNOSIS — J43.8 OTHER EMPHYSEMA: ICD-10-CM

## 2024-06-07 RX ORDER — ALBUTEROL SULFATE 0.63 MG/3ML
SOLUTION RESPIRATORY (INHALATION)
Qty: 600 ML | Refills: 3 | Status: SHIPPED | OUTPATIENT
Start: 2024-06-07

## 2024-06-07 NOTE — TELEPHONE ENCOUNTER
Rx Refill Note  Requested Prescriptions     Pending Prescriptions Disp Refills    albuterol (ACCUNEB) 0.63 MG/3ML nebulizer solution [Pharmacy Med Name: Albuterol Sulfate Inhalation Nebulization Solution 0.63 MG/3ML] 600 mL 0     Sig: INHALE 1 VIAL BY MOUTH VIA NEBULIZER EVERY 4 HOURS AS NEEDED FOR WHEEZING.      Last office visit with prescribing clinician: 2/23/2024   Last telemedicine visit with prescribing clinician: Visit date not found   Next office visit with prescribing clinician: Visit date not found     CBC & Differential (02/23/2024 11:41)   Comprehensive metabolic panel (02/23/2024 11:41)   Basic Metabolic Panel (12/31/2022 02:54)                     Would you like a call back once the refill request has been completed: [] Yes [] No    If the office needs to give you a call back, can they leave a voicemail: [] Yes [] No    Shraddha Hadley MA  06/07/24, 12:24 EDT

## 2024-09-20 ENCOUNTER — HOSPITAL ENCOUNTER (OUTPATIENT)
Dept: CT IMAGING | Facility: HOSPITAL | Age: 64
Discharge: HOME | End: 2024-09-20
Admitting: INTERNAL MEDICINE
Payer: COMMERCIAL

## 2024-09-20 DIAGNOSIS — R91.1 SOLITARY LUNG NODULE: ICD-10-CM

## 2024-09-20 PROCEDURE — 71250 CT THORAX DX C-: CPT

## 2024-12-03 RX ORDER — FLUTICASONE FUROATE, UMECLIDINIUM BROMIDE AND VILANTEROL TRIFENATATE 200; 62.5; 25 UG/1; UG/1; UG/1
1 POWDER RESPIRATORY (INHALATION) DAILY
Qty: 180 EACH | Refills: 0 | Status: SHIPPED | OUTPATIENT
Start: 2024-12-03

## 2024-12-29 ENCOUNTER — APPOINTMENT (OUTPATIENT)
Dept: CT IMAGING | Facility: HOSPITAL | Age: 64
End: 2024-12-29
Payer: COMMERCIAL

## 2024-12-29 ENCOUNTER — HOSPITAL ENCOUNTER (OUTPATIENT)
Facility: HOSPITAL | Age: 64
Setting detail: OBSERVATION
Discharge: HOME | End: 2024-12-30
Attending: EMERGENCY MEDICINE | Admitting: INTERNAL MEDICINE
Payer: COMMERCIAL

## 2024-12-29 DIAGNOSIS — A09 COLITIS, INFECTIOUS: ICD-10-CM

## 2024-12-29 DIAGNOSIS — K92.2 LOWER GI BLEEDING: Primary | ICD-10-CM

## 2024-12-29 PROBLEM — K52.9 COLITIS: Status: ACTIVE | Noted: 2024-12-29

## 2024-12-29 LAB
ADV 40+41 DNA STL QL NAA+NON-PROBE: NOT DETECTED
ALBUMIN SERPL-MCNC: 3.8 G/DL (ref 3.5–5.2)
ALBUMIN/GLOB SERPL: 1.2 G/DL
ALP SERPL-CCNC: 129 U/L (ref 39–117)
ALT SERPL W P-5'-P-CCNC: 12 U/L (ref 1–33)
ANION GAP SERPL CALCULATED.3IONS-SCNC: 11.7 MMOL/L (ref 5–15)
AST SERPL-CCNC: 18 U/L (ref 1–32)
ASTRO TYP 1-8 RNA STL QL NAA+NON-PROBE: NOT DETECTED
B PARAPERT DNA SPEC QL NAA+PROBE: NOT DETECTED
B PERT DNA SPEC QL NAA+PROBE: NOT DETECTED
BASOPHILS # BLD AUTO: 0.1 10*3/MM3 (ref 0–0.2)
BASOPHILS NFR BLD AUTO: 0.8 % (ref 0–1.5)
BILIRUB SERPL-MCNC: 0.3 MG/DL (ref 0–1.2)
BILIRUB UR QL STRIP: NEGATIVE
BUN SERPL-MCNC: 13 MG/DL (ref 8–23)
BUN/CREAT SERPL: 22.4 (ref 7–25)
C CAYETANENSIS DNA STL QL NAA+NON-PROBE: NOT DETECTED
C COLI+JEJ+UPSA DNA STL QL NAA+NON-PROBE: NOT DETECTED
C PNEUM DNA NPH QL NAA+NON-PROBE: NOT DETECTED
CALCIUM SPEC-SCNC: 8.8 MG/DL (ref 8.6–10.5)
CHLORIDE SERPL-SCNC: 98 MMOL/L (ref 98–107)
CLARITY UR: CLEAR
CO2 SERPL-SCNC: 25.3 MMOL/L (ref 22–29)
COLOR UR: YELLOW
CREAT SERPL-MCNC: 0.58 MG/DL (ref 0.57–1)
CRP SERPL-MCNC: 3.01 MG/DL (ref 0–0.5)
CRYPTOSP DNA STL QL NAA+NON-PROBE: NOT DETECTED
D-LACTATE SERPL-SCNC: 0.7 MMOL/L (ref 0.3–2)
DEPRECATED RDW RBC AUTO: 46.5 FL (ref 37–54)
E HISTOLYT DNA STL QL NAA+NON-PROBE: NOT DETECTED
EAEC PAA PLAS AGGR+AATA ST NAA+NON-PRB: NOT DETECTED
EC STX1+STX2 GENES STL QL NAA+NON-PROBE: NOT DETECTED
EGFRCR SERPLBLD CKD-EPI 2021: 100.6 ML/MIN/1.73
EOSINOPHIL # BLD AUTO: 0.18 10*3/MM3 (ref 0–0.4)
EOSINOPHIL NFR BLD AUTO: 1.4 % (ref 0.3–6.2)
EPEC EAE GENE STL QL NAA+NON-PROBE: NOT DETECTED
ERYTHROCYTE [DISTWIDTH] IN BLOOD BY AUTOMATED COUNT: 15.6 % (ref 12.3–15.4)
ETEC LTA+ST1A+ST1B TOX ST NAA+NON-PROBE: NOT DETECTED
FLUAV SUBTYP SPEC NAA+PROBE: NOT DETECTED
FLUBV RNA ISLT QL NAA+PROBE: NOT DETECTED
G LAMBLIA DNA STL QL NAA+NON-PROBE: NOT DETECTED
GLOBULIN UR ELPH-MCNC: 3.1 GM/DL
GLUCOSE SERPL-MCNC: 114 MG/DL (ref 65–99)
GLUCOSE UR STRIP-MCNC: NEGATIVE MG/DL
HADV DNA SPEC NAA+PROBE: NOT DETECTED
HCOV 229E RNA SPEC QL NAA+PROBE: NOT DETECTED
HCOV HKU1 RNA SPEC QL NAA+PROBE: NOT DETECTED
HCOV NL63 RNA SPEC QL NAA+PROBE: NOT DETECTED
HCOV OC43 RNA SPEC QL NAA+PROBE: NOT DETECTED
HCT VFR BLD AUTO: 41.4 % (ref 34–46.6)
HGB BLD-MCNC: 12.5 G/DL (ref 12–15.9)
HGB UR QL STRIP.AUTO: NEGATIVE
HMPV RNA NPH QL NAA+NON-PROBE: NOT DETECTED
HPIV1 RNA ISLT QL NAA+PROBE: NOT DETECTED
HPIV2 RNA SPEC QL NAA+PROBE: NOT DETECTED
HPIV3 RNA NPH QL NAA+PROBE: NOT DETECTED
HPIV4 P GENE NPH QL NAA+PROBE: NOT DETECTED
IMM GRANULOCYTES # BLD AUTO: 0.04 10*3/MM3 (ref 0–0.05)
IMM GRANULOCYTES NFR BLD AUTO: 0.3 % (ref 0–0.5)
KETONES UR QL STRIP: ABNORMAL
LEUKOCYTE ESTERASE UR QL STRIP.AUTO: NEGATIVE
LIPASE SERPL-CCNC: 19 U/L (ref 13–60)
LYMPHOCYTES # BLD AUTO: 1.32 10*3/MM3 (ref 0.7–3.1)
LYMPHOCYTES NFR BLD AUTO: 10.4 % (ref 19.6–45.3)
M PNEUMO IGG SER IA-ACNC: NOT DETECTED
MCH RBC QN AUTO: 24.9 PG (ref 26.6–33)
MCHC RBC AUTO-ENTMCNC: 30.2 G/DL (ref 31.5–35.7)
MCV RBC AUTO: 82.5 FL (ref 79–97)
MONOCYTES # BLD AUTO: 0.65 10*3/MM3 (ref 0.1–0.9)
MONOCYTES NFR BLD AUTO: 5.1 % (ref 5–12)
NEUTROPHILS NFR BLD AUTO: 10.46 10*3/MM3 (ref 1.7–7)
NEUTROPHILS NFR BLD AUTO: 82 % (ref 42.7–76)
NITRITE UR QL STRIP: NEGATIVE
NOROVIRUS GI+II RNA STL QL NAA+NON-PROBE: NOT DETECTED
NRBC BLD AUTO-RTO: 0 /100 WBC (ref 0–0.2)
P SHIGELLOIDES DNA STL QL NAA+NON-PROBE: NOT DETECTED
PH UR STRIP.AUTO: 5.5 [PH] (ref 5–8)
PLATELET # BLD AUTO: 499 10*3/MM3 (ref 140–450)
PMV BLD AUTO: 9 FL (ref 6–12)
POTASSIUM SERPL-SCNC: 4 MMOL/L (ref 3.5–5.2)
PROCALCITONIN SERPL-MCNC: 0.05 NG/ML (ref 0–0.25)
PROT SERPL-MCNC: 6.9 G/DL (ref 6–8.5)
PROT UR QL STRIP: NEGATIVE
RBC # BLD AUTO: 5.02 10*6/MM3 (ref 3.77–5.28)
RHINOVIRUS RNA SPEC NAA+PROBE: NOT DETECTED
RSV RNA NPH QL NAA+NON-PROBE: NOT DETECTED
RVA RNA STL QL NAA+NON-PROBE: NOT DETECTED
S ENT+BONG DNA STL QL NAA+NON-PROBE: NOT DETECTED
SAPO I+II+IV+V RNA STL QL NAA+NON-PROBE: NOT DETECTED
SARS-COV-2 RNA RESP QL NAA+PROBE: NOT DETECTED
SHIGELLA SP+EIEC IPAH ST NAA+NON-PROBE: NOT DETECTED
SODIUM SERPL-SCNC: 135 MMOL/L (ref 136–145)
SP GR UR STRIP: 1.02 (ref 1–1.03)
UROBILINOGEN UR QL STRIP: ABNORMAL
V CHOL+PARA+VUL DNA STL QL NAA+NON-PROBE: NOT DETECTED
V CHOLERAE DNA STL QL NAA+NON-PROBE: NOT DETECTED
WBC NRBC COR # BLD AUTO: 12.75 10*3/MM3 (ref 3.4–10.8)
Y ENTEROCOL DNA STL QL NAA+NON-PROBE: NOT DETECTED

## 2024-12-29 PROCEDURE — 83993 ASSAY FOR CALPROTECTIN FECAL: CPT | Performed by: INTERNAL MEDICINE

## 2024-12-29 PROCEDURE — 87449 NOS EACH ORGANISM AG IA: CPT

## 2024-12-29 PROCEDURE — 84145 PROCALCITONIN (PCT): CPT | Performed by: INTERNAL MEDICINE

## 2024-12-29 PROCEDURE — 87147 CULTURE TYPE IMMUNOLOGIC: CPT

## 2024-12-29 PROCEDURE — 81003 URINALYSIS AUTO W/O SCOPE: CPT

## 2024-12-29 PROCEDURE — 25010000002 HYDROMORPHONE 1 MG/ML SOLUTION: Performed by: EMERGENCY MEDICINE

## 2024-12-29 PROCEDURE — 83690 ASSAY OF LIPASE: CPT

## 2024-12-29 PROCEDURE — 96367 TX/PROPH/DG ADDL SEQ IV INF: CPT

## 2024-12-29 PROCEDURE — G0378 HOSPITAL OBSERVATION PER HR: HCPCS

## 2024-12-29 PROCEDURE — 99285 EMERGENCY DEPT VISIT HI MDM: CPT

## 2024-12-29 PROCEDURE — 87040 BLOOD CULTURE FOR BACTERIA: CPT

## 2024-12-29 PROCEDURE — 25010000002 ONDANSETRON PER 1 MG: Performed by: EMERGENCY MEDICINE

## 2024-12-29 PROCEDURE — 86140 C-REACTIVE PROTEIN: CPT | Performed by: INTERNAL MEDICINE

## 2024-12-29 PROCEDURE — 25010000002 CEFTRIAXONE PER 250 MG: Performed by: INTERNAL MEDICINE

## 2024-12-29 PROCEDURE — 96375 TX/PRO/DX INJ NEW DRUG ADDON: CPT

## 2024-12-29 PROCEDURE — 36415 COLL VENOUS BLD VENIPUNCTURE: CPT

## 2024-12-29 PROCEDURE — 96365 THER/PROPH/DIAG IV INF INIT: CPT

## 2024-12-29 PROCEDURE — 87324 CLOSTRIDIUM AG IA: CPT

## 2024-12-29 PROCEDURE — 80053 COMPREHEN METABOLIC PANEL: CPT

## 2024-12-29 PROCEDURE — 25010000002 METRONIDAZOLE 500 MG/100ML SOLUTION: Performed by: EMERGENCY MEDICINE

## 2024-12-29 PROCEDURE — 87507 IADNA-DNA/RNA PROBE TQ 12-25: CPT

## 2024-12-29 PROCEDURE — 25810000003 LACTATED RINGERS PER 1000 ML: Performed by: INTERNAL MEDICINE

## 2024-12-29 PROCEDURE — 25810000003 LACTATED RINGERS SOLUTION: Performed by: EMERGENCY MEDICINE

## 2024-12-29 PROCEDURE — 74177 CT ABD & PELVIS W/CONTRAST: CPT

## 2024-12-29 PROCEDURE — 0202U NFCT DS 22 TRGT SARS-COV-2: CPT | Performed by: INTERNAL MEDICINE

## 2024-12-29 PROCEDURE — 25510000001 IOPAMIDOL PER 1 ML

## 2024-12-29 PROCEDURE — 85025 COMPLETE CBC W/AUTO DIFF WBC: CPT

## 2024-12-29 PROCEDURE — 87154 CUL TYP ID BLD PTHGN 6+ TRGT: CPT

## 2024-12-29 PROCEDURE — 83605 ASSAY OF LACTIC ACID: CPT

## 2024-12-29 RX ORDER — NICOTINE POLACRILEX 4 MG
15 LOZENGE BUCCAL
Status: DISCONTINUED | OUTPATIENT
Start: 2024-12-29 | End: 2024-12-30 | Stop reason: HOSPADM

## 2024-12-29 RX ORDER — DEXTROSE MONOHYDRATE 25 G/50ML
25 INJECTION, SOLUTION INTRAVENOUS
Status: DISCONTINUED | OUTPATIENT
Start: 2024-12-29 | End: 2024-12-30 | Stop reason: HOSPADM

## 2024-12-29 RX ORDER — NITROGLYCERIN 0.4 MG/1
0.4 TABLET SUBLINGUAL
Status: DISCONTINUED | OUTPATIENT
Start: 2024-12-29 | End: 2024-12-30 | Stop reason: HOSPADM

## 2024-12-29 RX ORDER — BISACODYL 10 MG
10 SUPPOSITORY, RECTAL RECTAL DAILY PRN
Status: DISCONTINUED | OUTPATIENT
Start: 2024-12-29 | End: 2024-12-30 | Stop reason: HOSPADM

## 2024-12-29 RX ORDER — ALUMINA, MAGNESIA, AND SIMETHICONE 2400; 2400; 240 MG/30ML; MG/30ML; MG/30ML
15 SUSPENSION ORAL EVERY 6 HOURS PRN
Status: DISCONTINUED | OUTPATIENT
Start: 2024-12-29 | End: 2024-12-30 | Stop reason: HOSPADM

## 2024-12-29 RX ORDER — ONDANSETRON 4 MG/1
4 TABLET, ORALLY DISINTEGRATING ORAL EVERY 6 HOURS PRN
Status: DISCONTINUED | OUTPATIENT
Start: 2024-12-29 | End: 2024-12-30 | Stop reason: HOSPADM

## 2024-12-29 RX ORDER — IOPAMIDOL 755 MG/ML
100 INJECTION, SOLUTION INTRAVASCULAR
Status: COMPLETED | OUTPATIENT
Start: 2024-12-29 | End: 2024-12-29

## 2024-12-29 RX ORDER — SODIUM CHLORIDE 0.9 % (FLUSH) 0.9 %
10 SYRINGE (ML) INJECTION AS NEEDED
Status: DISCONTINUED | OUTPATIENT
Start: 2024-12-29 | End: 2024-12-30 | Stop reason: HOSPADM

## 2024-12-29 RX ORDER — BISACODYL 5 MG/1
5 TABLET, DELAYED RELEASE ORAL DAILY PRN
Status: DISCONTINUED | OUTPATIENT
Start: 2024-12-29 | End: 2024-12-30 | Stop reason: HOSPADM

## 2024-12-29 RX ORDER — ONDANSETRON 2 MG/ML
4 INJECTION INTRAMUSCULAR; INTRAVENOUS EVERY 6 HOURS PRN
Status: DISCONTINUED | OUTPATIENT
Start: 2024-12-29 | End: 2024-12-30 | Stop reason: HOSPADM

## 2024-12-29 RX ORDER — IBUPROFEN 600 MG/1
1 TABLET ORAL
Status: DISCONTINUED | OUTPATIENT
Start: 2024-12-29 | End: 2024-12-30 | Stop reason: HOSPADM

## 2024-12-29 RX ORDER — ONDANSETRON 2 MG/ML
4 INJECTION INTRAMUSCULAR; INTRAVENOUS ONCE
Status: COMPLETED | OUTPATIENT
Start: 2024-12-29 | End: 2024-12-29

## 2024-12-29 RX ORDER — SODIUM CHLORIDE 9 MG/ML
40 INJECTION, SOLUTION INTRAVENOUS AS NEEDED
Status: DISCONTINUED | OUTPATIENT
Start: 2024-12-29 | End: 2024-12-30 | Stop reason: HOSPADM

## 2024-12-29 RX ORDER — METRONIDAZOLE 500 MG/100ML
500 INJECTION, SOLUTION INTRAVENOUS EVERY 8 HOURS
Status: DISCONTINUED | OUTPATIENT
Start: 2024-12-30 | End: 2024-12-30 | Stop reason: HOSPADM

## 2024-12-29 RX ORDER — AMOXICILLIN 250 MG
2 CAPSULE ORAL 2 TIMES DAILY PRN
Status: DISCONTINUED | OUTPATIENT
Start: 2024-12-29 | End: 2024-12-30 | Stop reason: HOSPADM

## 2024-12-29 RX ORDER — SODIUM CHLORIDE 0.9 % (FLUSH) 0.9 %
10 SYRINGE (ML) INJECTION EVERY 12 HOURS SCHEDULED
Status: DISCONTINUED | OUTPATIENT
Start: 2024-12-29 | End: 2024-12-30 | Stop reason: HOSPADM

## 2024-12-29 RX ORDER — SODIUM CHLORIDE, SODIUM LACTATE, POTASSIUM CHLORIDE, CALCIUM CHLORIDE 600; 310; 30; 20 MG/100ML; MG/100ML; MG/100ML; MG/100ML
100 INJECTION, SOLUTION INTRAVENOUS CONTINUOUS
Status: DISCONTINUED | OUTPATIENT
Start: 2024-12-29 | End: 2024-12-30 | Stop reason: HOSPADM

## 2024-12-29 RX ORDER — POLYETHYLENE GLYCOL 3350 17 G/17G
17 POWDER, FOR SOLUTION ORAL DAILY PRN
Status: DISCONTINUED | OUTPATIENT
Start: 2024-12-29 | End: 2024-12-30 | Stop reason: HOSPADM

## 2024-12-29 RX ORDER — METRONIDAZOLE 500 MG/100ML
500 INJECTION, SOLUTION INTRAVENOUS ONCE
Status: COMPLETED | OUTPATIENT
Start: 2024-12-29 | End: 2024-12-29

## 2024-12-29 RX ADMIN — METRONIDAZOLE 500 MG: 500 INJECTION, SOLUTION INTRAVENOUS at 18:12

## 2024-12-29 RX ADMIN — SODIUM CHLORIDE, POTASSIUM CHLORIDE, SODIUM LACTATE AND CALCIUM CHLORIDE 100 ML/HR: 600; 310; 30; 20 INJECTION, SOLUTION INTRAVENOUS at 19:42

## 2024-12-29 RX ADMIN — SODIUM CHLORIDE, POTASSIUM CHLORIDE, SODIUM LACTATE AND CALCIUM CHLORIDE 1000 ML: 600; 310; 30; 20 INJECTION, SOLUTION INTRAVENOUS at 18:12

## 2024-12-29 RX ADMIN — ONDANSETRON 4 MG: 2 INJECTION INTRAMUSCULAR; INTRAVENOUS at 17:30

## 2024-12-29 RX ADMIN — Medication 10 ML: at 20:06

## 2024-12-29 RX ADMIN — IOPAMIDOL 100 ML: 755 INJECTION, SOLUTION INTRAVENOUS at 16:03

## 2024-12-29 RX ADMIN — HYDROMORPHONE HYDROCHLORIDE 0.5 MG: 1 INJECTION, SOLUTION INTRAMUSCULAR; INTRAVENOUS; SUBCUTANEOUS at 17:30

## 2024-12-29 RX ADMIN — CEFTRIAXONE 2000 MG: 2 INJECTION, POWDER, FOR SOLUTION INTRAMUSCULAR; INTRAVENOUS at 17:31

## 2024-12-29 NOTE — Clinical Note
Level of Care: Telemetry [5]   Diagnosis: Infectious colitis [260425]   Admitting Physician: LOYDA ASKEW [[id number]]   Attending Physician: LOYDA ASKEW [[id number]]

## 2024-12-29 NOTE — H&P
History and Physical   Elena Dangelo : 1960 MRN:[id number] LOS:0     Reason for admission: Infectious colitis     Assessment / Plan     # Acute abdominal pain along with hematochezia secondary to colitis  -pt got abdo pain NV and significant diarrhea with hematochezia  -Leukocytosis present.  -CTAP with Diffuse colonic wall thickening and prominent fluid-filled loops of small bowel in the lower abdomen. Findings are consistent with infectious or inflammatory colitis.  -IV rocephin and falgyl, IVF, pain med, zofran, CLD  -stool panel pending CRP and calprotectin   -GI consult     #COPD  -Resp treatment to continue     Code Status (Patient has no pulse and is not breathing): CPR (Attempt to Resuscitate)  Medical Interventions (Patient has pulse or is breathing): Full Support       Nutrition: Diet: Liquid; Clear Liquid; Fluid Consistency: Thin (IDDSI 0)  NPO Diet NPO Type: Strict NPO     DVT Prophylaxis: Active VTE Prophylaxis  Mechanical:        Start        24 1704  Maintain Sequential Compression Device  Continuous                          Select Pharmacologic VTE Prophylaxis if Desired & Appropriate      History of Present illness     A 64 y.o. old female patient with PMH of COPD presents to the hospital with complaints of abdo pain diarrhea NV. She has cramping abdo pain with hematochezia.  Patient labs showing leukocytosis of 12.  Lactic acid normal.  UA without urinary tract infection.  CMP is still pending.  CT abdominal pelvis with infectious or inflammatory colitis. IV rocephin and flagyl started.  Stool panel pending.  GI is consulted.    Patient was admitted for colitis management.    Subjective / Review of systems     Review of Systems   Abdo pain     Past Medical/Surgical/Social/Family History & Allergies     Past Medical History:   Diagnosis Date    COPD (chronic obstructive pulmonary disease)       Past Surgical History:   Procedure Laterality Date    CHOLECYSTECTOMY      TUBAL  ABDOMINAL LIGATION        Social History     Socioeconomic History    Marital status:    Tobacco Use    Smoking status: Every Day     Current packs/day: 1.00     Average packs/day: 1 pack/day for 15.0 years (15.0 ttl pk-yrs)     Types: Cigarettes     Passive exposure: Current    Smokeless tobacco: Never   Vaping Use    Vaping status: Never Used   Substance and Sexual Activity    Alcohol use: Never    Drug use: Never    Sexual activity: Defer      Family History   Problem Relation Age of Onset    Hypertension Mother     Hypertension Father     Hypertension Brother       Allergies   Allergen Reactions    Azithromycin Anaphylaxis    Naproxen Anaphylaxis    Penicillin G Swelling    Penicillins Anaphylaxis and Swelling    Codeine Nausea And Vomiting        Home Medications     Prior to Admission medications    Medication Sig Start Date End Date Taking? Authorizing Provider   albuterol (ACCUNEB) 0.63 MG/3ML nebulizer solution INHALE 1 VIAL BY MOUTH VIA NEBULIZER EVERY 4 HOURS AS NEEDED FOR WHEEZING. 6/7/24   Gabby Barrios APRN   albuterol sulfate  (90 Base) MCG/ACT inhaler Inhale 1 puff Every 6 (Six) Hours As Needed for Wheezing. 3/12/24   Gabby Barrios APRN   albuterol sulfate  (90 Base) MCG/ACT inhaler Inhale 2 puffs Every 4 (Four) Hours As Needed for Wheezing. 5/9/24   Lydia Mo MD   EPINEPHrine (EPIPEN) 0.3 MG/0.3ML solution auto-injector injection Inject 0.3 mL under the skin into the appropriate area as directed 1 Time for 1 dose. 11/14/22   ProviderOly MD   ipratropium-albuterol (DUO-NEB) 0.5-2.5 mg/3 ml nebulizer Take 3 mL by nebulization 4 (Four) Times a Day As Needed for Wheezing for up to 30 days. 4/15/24 5/15/24  Lydia Mo MD   levoFLOXacin (LEVAQUIN) 750 MG tablet Take 1 tablet by mouth Daily. 12/14/24   Jody Lewis MD   nicotine (Nicotrol) 10 MG inhaler Inhale 2 puffs As Needed for Smoking Cessation. Inhale 2 puffs as needed for smoking  cessation  Patient not taking: Reported on 4/15/2024 11/14/23   Gabby Barrios APRN   predniSONE (DELTASONE) 10 MG (21) dose pack Use as directed on package 12/14/24   Jody Lewis MD Trelegy Ellipta 200-62.5-25 MCG/ACT inhaler INHALE 1 PUFF BY MOUTH EVERY DAY 12/3/24   Gabby Barrios APRN      Objective / Physical Exam   Vital signs:  Temp: 99.5 °F (37.5 °C)  BP: 119/62  Heart Rate: 104  Resp: 16  SpO2: 91 %  Weight: 60.3 kg (132 lb 15 oz)    Admission Weight: Weight: 60.3 kg (132 lb 15 oz)    Physical Exam   Physical Exam  HENT:      Head: Normocephalic and atraumatic.      Nose: Nose normal.   Eyes:      Extraocular Movements: Extraocular movements intact.      Conjunctivae/sclera: Conjunctivae normal.      Pupils: Pupils are equal, round, and reactive to light.   Cardiovascular:      Rate and Rhythm: normal       Pulses: Normal pulses.      Heart sounds: Normal heart sounds.   Pulmonary:      Effort: normal      Breath sounds: normal   Abdominal:      Abdo tenderness   Musculoskeletal:         General: Normal range of motion.      Cervical back: Normal range of motion and neck supple.   Skin:     General: Skin is dry.   Neurological:      General: No focal deficit present.      Mental Status: alert.   Psychiatric:         Mood and Affect: Mood normal.        Labs     Results from last 7 days   Lab Units 12/29/24  1459   WBC 10*3/mm3 12.75*   HEMATOCRIT % 41.4   PLATELETS 10*3/mm3 499*             Current Medications   Scheduled Meds:cefTRIAXone, 2,000 mg, Intravenous, Q24H  HYDROmorphone, 0.5 mg, Intravenous, Once  lactated ringers, 1,000 mL, Intravenous, Once  metroNIDAZOLE, 500 mg, Intravenous, Once  ondansetron, 4 mg, Intravenous, Once  sodium chloride, 10 mL, Intravenous, Q12H         Continuous Infusions:lactated ringers, 100 mL/hr         Marianela Breaux MD  [facility]   12/29/24   17:09 EST

## 2024-12-29 NOTE — ED PROVIDER NOTES
Provider in Triage Note  64-year-old female with history of COPD presents the ED with complaints of lower abdominal cramping that started 4 days ago with nausea and significant diarrhea.  Patient reports she has over 10 bowel movements per day.  She started she developed bright red blood in her stool this morning with her diarrhea.  Patient denies syncope, fever, chest pain, shortness of breath, UTI symptoms, no blood thinners.  She was recently treated with antibiotics for pneumonia    PCP: Sophie    Due to significant overcrowding in the emergency department patient was initially seen and evaluated in triage.  Provider in triage recommended patient placement in the treatment area to initiate therapy and movement to an ER bed as soon as possible.        Subjective   History of Present Illness  Provider in triage state is included in history of present illness.  The patient reports bloody diarrhea today.  She states she has had watery stools for the last 4 days without hematemesis.        Review of Systems   Constitutional:  Positive for chills, fatigue and fever. Negative for unexpected weight change.   HENT:  Negative for trouble swallowing.    Musculoskeletal:  Negative for back pain.   Neurological:  Negative for headaches.   Hematological:  Does not bruise/bleed easily.       Past Medical History:   Diagnosis Date    COPD (chronic obstructive pulmonary disease)    Patient has never had a colonoscopy.  He was evaluated once in the past for possible C. difficile after antibiotic therapy    Allergies   Allergen Reactions    Azithromycin Anaphylaxis    Naproxen Anaphylaxis    Penicillin G Swelling    Penicillins Anaphylaxis and Swelling    Codeine Nausea And Vomiting       Past Surgical History:   Procedure Laterality Date    CHOLECYSTECTOMY      TUBAL ABDOMINAL LIGATION         Family History   Problem Relation Age of Onset    Hypertension Mother     Hypertension Father     Hypertension Brother        Social  History     Socioeconomic History    Marital status:    Tobacco Use    Smoking status: Every Day     Current packs/day: 1.00     Average packs/day: 1 pack/day for 15.0 years (15.0 ttl pk-yrs)     Types: Cigarettes     Passive exposure: Current    Smokeless tobacco: Never   Vaping Use    Vaping status: Never Used   Substance and Sexual Activity    Alcohol use: Never    Drug use: Never    Sexual activity: Defer       Reports no unusual food water travel or activity    Objective   Physical Exam  Alert Aye Coma Scale 15   HEENT: Pupils equal and reactive to light. Conjunctivae are not injected. Normal tympanic membranes. Oropharynx and nares are normal.   Neck: Supple. Midline trachea. No JVD. No goiter.   Chest: Clear and equal breath sounds bilaterally, regular rate and rhythm without murmur or rub.   Abdomen: Positive bowel sounds, tenderness is noted mostly on the left side of the midline worse in the left lower quadrant.  There is a negative reverse Rovsing test.  There is no referral pain to the left shoulder, nondistended. No rebound or peritoneal signs. No CVA tenderness.   Extremities no clubbing. cyanosis or edema. Motor sensory exam is normal. The full range of motion is intact no ecchymosis   Skin: Warm and dry, no rashes or petechia.   Lymphatic: No regional lymphadenopathy. No calf pain, swelling or Homans sign    Procedures           ED Course  ED Course as of 12/29/24 1704   Sun Dec 29, 2024   1648 ED overflow/overcrowding condition [TH]      ED Course User Index  [TH] Kamar Hui MD                                           Medications   sodium chloride 0.9 % flush 10 mL (has no administration in time range)   lactated ringers bolus 1,000 mL (has no administration in time range)   ondansetron (ZOFRAN) injection 4 mg (has no administration in time range)   HYDROmorphone (DILAUDID) injection 0.5 mg (has no administration in time range)   metroNIDAZOLE (FLAGYL) IVPB 500 mg (has no  administration in time range)   cefTRIAXone (ROCEPHIN) 2,000 mg in sodium chloride 0.9 % 100 mL MBP (has no administration in time range)   iopamidol (ISOVUE-370) 76 % injection 100 mL (100 mL Intravenous Given 12/29/24 1603)     CT Abdomen Pelvis With Contrast    Result Date: 12/29/2024  Impression: 1.Diffuse colonic wall thickening and prominent fluid-filled loops of small bowel in the lower abdomen. Findings are consistent with infectious or inflammatory colitis. 2.Mild patchy opacities in the right lung base, likely atelectasis or infection. 3.Additional chronic findings as above. Electronically Signed: Lowell Browne DO  12/29/2024 4:16 PM EST  Workstation ID: [id number]                 Medical Decision Making  Pain nausea were treated in the emergency department patient was hydrated with lactated Ringer's and started on IV metronidazole.  The case was discussed with the hospitalist.  The patient will be admitted for additional evaluation.  Patient should have colonoscopy at some point in the near future    Amount and/or Complexity of Data Reviewed  Independent Historian: spouse  Labs: ordered. Decision-making details documented in ED Course.  Radiology: ordered and independent interpretation performed.    Risk  OTC drugs.  Prescription drug management.  Parenteral controlled substances.  Decision regarding hospitalization.        Final diagnoses:   Lower GI bleeding   Colitis, infectious       ED Disposition  ED Disposition       ED Disposition   Decision to Admit    Condition   --    Comment   Level of Care: Telemetry [5]   Diagnosis: Infectious colitis [[id number]]   Admitting Physician: LOYDA ASKEW [[id number]]   Attending Physician: LOYDA ASKEW [[id number]]                 No follow-up provider specified.       Medication List      No changes were made to your prescriptions during this visit.            Kamar Hui MD  12/29/24 2022

## 2024-12-30 ENCOUNTER — ON CAMPUS - OUTPATIENT (OUTPATIENT)
Dept: URBAN - METROPOLITAN AREA HOSPITAL 85 | Facility: HOSPITAL | Age: 64
End: 2024-12-30
Payer: COMMERCIAL

## 2024-12-30 ENCOUNTER — READMISSION MANAGEMENT (OUTPATIENT)
Dept: CALL CENTER | Facility: HOSPITAL | Age: 64
End: 2024-12-30
Payer: COMMERCIAL

## 2024-12-30 VITALS
SYSTOLIC BLOOD PRESSURE: 111 MMHG | HEART RATE: 82 BPM | DIASTOLIC BLOOD PRESSURE: 64 MMHG | TEMPERATURE: 98.1 F | RESPIRATION RATE: 20 BRPM | BODY MASS INDEX: 26.8 KG/M2 | WEIGHT: 132.94 LBS | HEIGHT: 59 IN | OXYGEN SATURATION: 91 %

## 2024-12-30 DIAGNOSIS — D72.829 ELEVATED WHITE BLOOD CELL COUNT, UNSPECIFIED: ICD-10-CM

## 2024-12-30 DIAGNOSIS — K62.5 HEMORRHAGE OF ANUS AND RECTUM: ICD-10-CM

## 2024-12-30 DIAGNOSIS — R74.8 ABNORMAL LEVELS OF OTHER SERUM ENZYMES: ICD-10-CM

## 2024-12-30 DIAGNOSIS — R19.7 DIARRHEA, UNSPECIFIED: ICD-10-CM

## 2024-12-30 DIAGNOSIS — R93.3 ABNORMAL FINDINGS ON DIAGNOSTIC IMAGING OF OTHER PARTS OF DI: ICD-10-CM

## 2024-12-30 DIAGNOSIS — Z90.49 ACQUIRED ABSENCE OF OTHER SPECIFIED PARTS OF DIGESTIVE TRACT: ICD-10-CM

## 2024-12-30 DIAGNOSIS — R11.2 NAUSEA WITH VOMITING, UNSPECIFIED: ICD-10-CM

## 2024-12-30 LAB
ANION GAP SERPL CALCULATED.3IONS-SCNC: 8.3 MMOL/L (ref 5–15)
BACTERIA BLD CULT: ABNORMAL
BASOPHILS # BLD AUTO: 0.07 10*3/MM3 (ref 0–0.2)
BASOPHILS NFR BLD AUTO: 1.1 % (ref 0–1.5)
BOTTLE TYPE: ABNORMAL
BUN SERPL-MCNC: 8 MG/DL (ref 8–23)
BUN/CREAT SERPL: 16 (ref 7–25)
C DIFF GDH + TOXINS A+B STL QL IA.RAPID: NEGATIVE
C DIFF GDH + TOXINS A+B STL QL IA.RAPID: NEGATIVE
CALCIUM SPEC-SCNC: 8.3 MG/DL (ref 8.6–10.5)
CHLORIDE SERPL-SCNC: 104 MMOL/L (ref 98–107)
CO2 SERPL-SCNC: 26.7 MMOL/L (ref 22–29)
CREAT SERPL-MCNC: 0.5 MG/DL (ref 0.57–1)
DEPRECATED RDW RBC AUTO: 46.5 FL (ref 37–54)
EGFRCR SERPLBLD CKD-EPI 2021: 104.2 ML/MIN/1.73
EOSINOPHIL # BLD AUTO: 0.3 10*3/MM3 (ref 0–0.4)
EOSINOPHIL NFR BLD AUTO: 4.8 % (ref 0.3–6.2)
ERYTHROCYTE [DISTWIDTH] IN BLOOD BY AUTOMATED COUNT: 15.5 % (ref 12.3–15.4)
GGT SERPL-CCNC: 11 U/L (ref 5–36)
GLUCOSE SERPL-MCNC: 97 MG/DL (ref 65–99)
HCT VFR BLD AUTO: 33.9 % (ref 34–46.6)
HGB BLD-MCNC: 10.2 G/DL (ref 12–15.9)
IMM GRANULOCYTES # BLD AUTO: 0.01 10*3/MM3 (ref 0–0.05)
IMM GRANULOCYTES NFR BLD AUTO: 0.2 % (ref 0–0.5)
LYMPHOCYTES # BLD AUTO: 1.23 10*3/MM3 (ref 0.7–3.1)
LYMPHOCYTES NFR BLD AUTO: 19.9 % (ref 19.6–45.3)
MAGNESIUM SERPL-MCNC: 1.9 MG/DL (ref 1.6–2.4)
MCH RBC QN AUTO: 24.7 PG (ref 26.6–33)
MCHC RBC AUTO-ENTMCNC: 30.1 G/DL (ref 31.5–35.7)
MCV RBC AUTO: 82.1 FL (ref 79–97)
MONOCYTES # BLD AUTO: 0.47 10*3/MM3 (ref 0.1–0.9)
MONOCYTES NFR BLD AUTO: 7.6 % (ref 5–12)
NEUTROPHILS NFR BLD AUTO: 4.11 10*3/MM3 (ref 1.7–7)
NEUTROPHILS NFR BLD AUTO: 66.4 % (ref 42.7–76)
NRBC BLD AUTO-RTO: 0 /100 WBC (ref 0–0.2)
PHOSPHATE SERPL-MCNC: 3.1 MG/DL (ref 2.5–4.5)
PLATELET # BLD AUTO: 395 10*3/MM3 (ref 140–450)
PMV BLD AUTO: 8.9 FL (ref 6–12)
POTASSIUM SERPL-SCNC: 3.9 MMOL/L (ref 3.5–5.2)
RBC # BLD AUTO: 4.13 10*6/MM3 (ref 3.77–5.28)
SODIUM SERPL-SCNC: 139 MMOL/L (ref 136–145)
WBC NRBC COR # BLD AUTO: 6.19 10*3/MM3 (ref 3.4–10.8)

## 2024-12-30 PROCEDURE — 83735 ASSAY OF MAGNESIUM: CPT | Performed by: INTERNAL MEDICINE

## 2024-12-30 PROCEDURE — G0378 HOSPITAL OBSERVATION PER HR: HCPCS

## 2024-12-30 PROCEDURE — 82977 ASSAY OF GGT: CPT | Performed by: NURSE PRACTITIONER

## 2024-12-30 PROCEDURE — 25010000002 METRONIDAZOLE 500 MG/100ML SOLUTION: Performed by: INTERNAL MEDICINE

## 2024-12-30 PROCEDURE — 99223 1ST HOSP IP/OBS HIGH 75: CPT | Performed by: NURSE PRACTITIONER

## 2024-12-30 PROCEDURE — 85025 COMPLETE CBC W/AUTO DIFF WBC: CPT | Performed by: INTERNAL MEDICINE

## 2024-12-30 PROCEDURE — 84100 ASSAY OF PHOSPHORUS: CPT | Performed by: INTERNAL MEDICINE

## 2024-12-30 PROCEDURE — 80048 BASIC METABOLIC PNL TOTAL CA: CPT | Performed by: INTERNAL MEDICINE

## 2024-12-30 PROCEDURE — 84080 ASSAY ALKALINE PHOSPHATASES: CPT | Performed by: NURSE PRACTITIONER

## 2024-12-30 PROCEDURE — 84075 ASSAY ALKALINE PHOSPHATASE: CPT | Performed by: NURSE PRACTITIONER

## 2024-12-30 PROCEDURE — 87040 BLOOD CULTURE FOR BACTERIA: CPT | Performed by: INTERNAL MEDICINE

## 2024-12-30 RX ORDER — CEFDINIR 300 MG/1
300 CAPSULE ORAL 2 TIMES DAILY
Qty: 18 CAPSULE | Refills: 0 | Status: SHIPPED | OUTPATIENT
Start: 2024-12-30 | End: 2025-01-08

## 2024-12-30 RX ORDER — SACCHAROMYCES BOULARDII 250 MG
250 CAPSULE ORAL 2 TIMES DAILY
Qty: 60 CAPSULE | Refills: 0 | Status: SHIPPED | OUTPATIENT
Start: 2024-12-30

## 2024-12-30 RX ORDER — METRONIDAZOLE 500 MG/1
500 TABLET ORAL 3 TIMES DAILY
Qty: 21 TABLET | Refills: 0 | Status: SHIPPED | OUTPATIENT
Start: 2024-12-30 | End: 2024-12-30

## 2024-12-30 RX ORDER — CEFDINIR 300 MG/1
300 CAPSULE ORAL 2 TIMES DAILY
Qty: 14 CAPSULE | Refills: 0 | Status: SHIPPED | OUTPATIENT
Start: 2024-12-30 | End: 2024-12-30

## 2024-12-30 RX ORDER — METRONIDAZOLE 500 MG/1
500 TABLET ORAL 3 TIMES DAILY
Qty: 27 TABLET | Refills: 0 | Status: SHIPPED | OUTPATIENT
Start: 2024-12-30

## 2024-12-30 RX ORDER — VITAMIN K2 90 MCG
1 CAPSULE ORAL DAILY
Qty: 30 CAPSULE | Refills: 0 | Status: SHIPPED | OUTPATIENT
Start: 2024-12-30

## 2024-12-30 RX ADMIN — METRONIDAZOLE 500 MG: 5 INJECTION, SOLUTION INTRAVENOUS at 01:54

## 2024-12-30 RX ADMIN — METRONIDAZOLE 500 MG: 5 INJECTION, SOLUTION INTRAVENOUS at 10:11

## 2024-12-30 RX ADMIN — Medication 10 ML: at 10:11

## 2024-12-30 NOTE — CASE MANAGEMENT/SOCIAL WORK
Case Management Discharge Note      Final Note: HOME  DISCHARGED PRIOR TO CASE MANAGEMENT SEEING PATIENT         Selected Continued Care - Discharged on 12/30/2024 Admission date: 12/29/2024 - Discharge disposition: Home or Self Care            Transportation Services  Private: Car    Final Discharge Disposition Code: 01 - home or self-care

## 2024-12-30 NOTE — CONSULTS
GI CONSULT  NOTE:    Referring Provider:  Dr. Breaux    Chief complaint: Colitis, abdominal pain, diarrhea    Subjective .     History of present illness: Elena Dangelo is a 64 y.o. female with history of COPD and cholecystectomy who presents with complaints of abdominal pain, diarrhea, and nausea/vomiting.  The patient reports an acute onset of symptoms 4 days ago.  States that she was having 10 or more bowel movements daily, but diarrhea is now beginning to slow.  She reports 2 bowel movements overnight.  She did have some bright red blood per rectum yesterday, but denies melena.  Has had associated nausea, but denies vomiting.  No heartburn or dysphagia.  Does report taking 600 mg of ibuprofen 3 times daily.  She was having lower abdominal pain with diarrhea, but this resolved last night as well.  States that normally she moves her bowels regularly and denies chronic diarrhea at home.  She believes that she may have had a minimal amount of weight loss recently.  However, does report being on antibiotics for pneumonia recently attributes weight loss to this.  She denies any family history of colon cancer.      Endo History:  No record of previous endoscopy.    Past Medical History:  Past Medical History:   Diagnosis Date    COPD (chronic obstructive pulmonary disease)        Past Surgical History:  Past Surgical History:   Procedure Laterality Date    CHOLECYSTECTOMY      TUBAL ABDOMINAL LIGATION         Social History:  Social History     Tobacco Use    Smoking status: Every Day     Current packs/day: 1.00     Average packs/day: 1 pack/day for 15.0 years (15.0 ttl pk-yrs)     Types: Cigarettes     Passive exposure: Current    Smokeless tobacco: Never   Vaping Use    Vaping status: Never Used   Substance Use Topics    Alcohol use: Never    Drug use: Never       Family History:  Family History   Problem Relation Age of Onset    Hypertension Mother     Hypertension Father     Hypertension Brother         Medications:  Medications Prior to Admission   Medication Sig Dispense Refill Last Dose/Taking    albuterol (ACCUNEB) 0.63 MG/3ML nebulizer solution INHALE 1 VIAL BY MOUTH VIA NEBULIZER EVERY 4 HOURS AS NEEDED FOR WHEEZING. 600 mL 3     albuterol sulfate  (90 Base) MCG/ACT inhaler Inhale 1 puff Every 6 (Six) Hours As Needed for Wheezing. 8 g 3     albuterol sulfate  (90 Base) MCG/ACT inhaler Inhale 2 puffs Every 4 (Four) Hours As Needed for Wheezing. 6.7 g 5     EPINEPHrine (EPIPEN) 0.3 MG/0.3ML solution auto-injector injection Inject 0.3 mL under the skin into the appropriate area as directed 1 Time for 1 dose.       ipratropium-albuterol (DUO-NEB) 0.5-2.5 mg/3 ml nebulizer Take 3 mL by nebulization 4 (Four) Times a Day As Needed for Wheezing for up to 30 days. 120 mL 11     nicotine (Nicotrol) 10 MG inhaler Inhale 2 puffs As Needed for Smoking Cessation. Inhale 2 puffs as needed for smoking cessation (Patient not taking: Reported on 4/15/2024) 168 each 0     Trelegy Ellipta 200-62.5-25 MCG/ACT inhaler INHALE 1 PUFF BY MOUTH EVERY  each 0        Scheduled Meds:cefTRIAXone, 2,000 mg, Intravenous, Q24H  metroNIDAZOLE, 500 mg, Intravenous, Q8H  sodium chloride, 10 mL, Intravenous, Q12H      Continuous Infusions:lactated ringers, 100 mL/hr, Last Rate: 100 mL/hr (12/29/24 1942)      PRN Meds:.  aluminum-magnesium hydroxide-simethicone    senna-docusate sodium **AND** polyethylene glycol **AND** bisacodyl **AND** bisacodyl    Calcium Replacement - Follow Nurse / BPA Driven Protocol    dextrose    dextrose    glucagon (human recombinant)    Magnesium Standard Dose Replacement - Follow Nurse / BPA Driven Protocol    melatonin    nitroglycerin    ondansetron ODT **OR** ondansetron    Phosphorus Replacement - Follow Nurse / BPA Driven Protocol    Potassium Replacement - Follow Nurse / BPA Driven Protocol    [COMPLETED] Insert Peripheral IV **AND** sodium chloride    sodium chloride    sodium  chloride    ALLERGIES:  Azithromycin, Naproxen, Penicillin g, Penicillins, and Codeine    ROS:  The following systems were reviewed;   Constitution:  No fevers, no chills, unintentional weight loss  Skin: no rash, no jaundice  Eyes:  No blurry vision, no eye pain  HENT:  No change in hearing or smell  Resp:  No dyspnea or cough  CV:  No chest pain or palpitations  :  No dysuria, hematuria  Musculoskeletal:  No leg cramps or arthralgias  Neuro:  No tremor, no numbness  Psych:  No depression or confusion    Objective     Vital Signs:   Vitals:    12/29/24 2200 12/29/24 2214 12/29/24 2259 12/30/24 0425   BP: 121/63 122/64 126/75 129/58   BP Location:    Left arm   Patient Position:    Lying   Pulse: 79 79 86 82   Resp:    18   Temp:    98.2 °F (36.8 °C)   TempSrc:    Oral   SpO2: 92% 92% 95% 91%   Weight:       Height:           Physical Exam:       General Appearance:    Awake and alert, in no acute distress   Head:    Normocephalic, without obvious abnormality, atraumatic   Throat:   No oral lesions, no thrush, oral mucosa moist   Lungs:     Respirations regular, even and unlabored   Chest Wall:    No abnormalities observed   Abdomen:     Soft, non-tender, no rebound or guarding, non-distended   Rectal:     Deferred   Extremities:   Moves all extremities, no edema, no cyanosis   Pulses:   Pulses palpable and equal bilaterally   Skin:   No rash, no jaundice, normal palpation   Lymph nodes:   No cervical, supraclavicular or submandibular palpable adenopathy   Neurologic:   Cranial nerves 2 - 12 grossly intact, no asterixis       Results Review:   I reviewed the patient's labs and imaging.  Results from last 7 days   Lab Units 12/29/24  1459   WBC 10*3/mm3 12.75*   HEMOGLOBIN g/dL 12.5   PLATELETS 10*3/mm3 499*     Results from last 7 days   Lab Units 12/29/24  1743   SODIUM mmol/L 135*   POTASSIUM mmol/L 4.0   CHLORIDE mmol/L 98   CO2 mmol/L 25.3   BUN mg/dL 13   CREATININE mg/dL 0.58   GLUCOSE mg/dL 114*   ALBUMIN  "g/dL 3.8   BILIRUBIN mg/dL 0.3   ALK PHOS U/L 129*   AST (SGOT) U/L 18   ALT (SGPT) U/L 12   LIPASE U/L 19   CRP mg/dL 3.01*     Estimated Creatinine Clearance: 81.5 mL/min (by C-G formula based on SCr of 0.58 mg/dL).  No results found for: \"HGBA1C\"      Infection   Results from last 7 days   Lab Units 12/29/24  1743   PROCALCITONIN ng/mL 0.05     UA    Results from last 7 days   Lab Units 12/29/24  1618   NITRITE UA  Negative     Microbiology Results (last 10 days)       Procedure Component Value - Date/Time    Respiratory Panel PCR w/COVID-19(SARS-CoV-2) [facility] In-House, NP Swab in UTM/VTM, 2 HR TAT - Swab, Nasopharynx [[id number]]  (Normal) Collected: 12/29/24 1736    Lab Status: Final result Specimen: Swab from Nasopharynx Updated: 12/29/24 1829     ADENOVIRUS, PCR Not Detected     Coronavirus 229E Not Detected     Coronavirus HKU1 Not Detected     Coronavirus NL63 Not Detected     Coronavirus OC43 Not Detected     COVID19 Not Detected     Human Metapneumovirus Not Detected     Human Rhinovirus/Enterovirus Not Detected     Influenza A PCR Not Detected     Influenza B PCR Not Detected     Parainfluenza Virus 1 Not Detected     Parainfluenza Virus 2 Not Detected     Parainfluenza Virus 3 Not Detected     Parainfluenza Virus 4 Not Detected     RSV, PCR Not Detected     Bordetella pertussis pcr Not Detected     Bordetella parapertussis PCR Not Detected     Chlamydophila pneumoniae PCR Not Detected     Mycoplasma pneumo by PCR Not Detected    Narrative:      In the setting of a positive respiratory panel with a viral infection PLUS a negative procalcitonin without other underlying concern for bacterial infection, consider observing off antibiotics or discontinuation of antibiotics and continue supportive care. If the respiratory panel is positive for atypical bacterial infection (Bordetella pertussis, Chlamydophila pneumoniae, or Mycoplasma pneumoniae), consider antibiotic de-escalation to target " atypical bacterial infection.    Gastrointestinal Panel, PCR - Stool, Per Rectum [[id number]]  (Normal) Collected: 12/29/24 1653    Lab Status: Final result Specimen: Stool from Per Rectum Updated: 12/29/24 1813     Campylobacter Not Detected     Plesiomonas shigelloides Not Detected     Salmonella Not Detected     Vibrio Not Detected     Vibrio cholerae Not Detected     Yersinia enterocolitica Not Detected     Enteroaggregative E. coli (EAEC) Not Detected     Enteropathogenic E. coli (EPEC) Not Detected     Enterotoxigenic E. coli (ETEC) lt/st Not Detected     Shiga-like toxin-producing E. coli (STEC) stx1/stx2 Not Detected     Shigella/Enteroinvasive E. coli (EIEC) Not Detected     Cryptosporidium Not Detected     Cyclospora cayetanensis Not Detected     Entamoeba histolytica Not Detected     Giardia lamblia Not Detected     Adenovirus F40/41 Not Detected     Astrovirus Not Detected     Norovirus GI/GII Not Detected     Rotavirus A Not Detected     Sapovirus (I, II, IV or V) Not Detected    Clostridioides difficile Toxin - Stool, Per Rectum [[id number]]  (Normal) Collected: 12/29/24 1653    Lab Status: Final result Specimen: Stool from Per Rectum Updated: 12/30/24 0713    Narrative:      The following orders were created for panel order Clostridioides difficile Toxin - Stool, Per Rectum.  Procedure                               Abnormality         Status                     ---------                               -----------         ------                     Clostridioides difficile...[[id number]]  Normal              Final result                 Please view results for these tests on the individual orders.    Clostridioides difficile EIA - Stool, Per Rectum [[id number]]  (Normal) Collected: 12/29/24 1653    Lab Status: Final result Specimen: Stool from Per Rectum Updated: 12/30/24 0713     C Diff GDH Ag Negative     C.diff Toxin Ag Negative    Narrative:      The result indicates the absence of toxigenic C.difficile  from stool specimen.          Imaging Results (Last 72 Hours)       Procedure Component Value Units Date/Time    CT Abdomen Pelvis With Contrast [[id number]] Collected: 12/29/24 1612     Updated: 12/29/24 1618    Narrative:      CT ABDOMEN PELVIS W CONTRAST    Date of Exam: 12/29/2024 3:50 PM EST    Indication: lower abd cramping, nausea, diarrhea, BRBPR.    Comparison: None available.    Technique: Axial CT images were obtained of the abdomen and pelvis following the uneventful intravenous administration of iodinated contrast. Sagittal and coronal reconstructions were performed.  Automated exposure control and iterative reconstruction   methods were used.        Findings:  Visualized Chest: Mild patchy opacities noted within the right lung base. Otherwise unremarkable    Liver: Liver is normal in size and CT density. No focal lesions.    Gallbladder: Status post cholecystectomy.    Bile Ducts: Dilation of the intrahepatic and extrahepatic bile ducts, most likely due to postcholecystectomy status.    Spleen: Spleen is normal in size and CT density.    Pancreas: Pancreas is normal. There is no evidence of pancreatic mass or peripancreatic fluid.    Adrenals: Adrenal glands are unremarkable.    Kidneys: Presumed small bilateral cysts. Otherwise unremarkable    Gastrointestinal: Diffuse colonic wall thickening. Additional prominent fluid-filled loops of small bowel noted within the lower abdomen. No significant distention to suggest high-grade bowel obstruction.    Bladder: The bladder is normal.    Pelvis:  No suspecious mass.    Peritoneum/Mesentery: No fluid collection, ascities, or free air.      Lymph Nodes: No lymphadenopathy.    Vasculature: Unremarkable    Abdominal Wall: Unremarkable    Bony Structures: Tarlov cysts noted within the sacrum. No acute osseous abnormality. Old healed right rib fractures.      Impression:      Impression:  1.Diffuse colonic wall thickening and prominent fluid-filled loops of small  bowel in the lower abdomen. Findings are consistent with infectious or inflammatory colitis.  2.Mild patchy opacities in the right lung base, likely atelectasis or infection.  3.Additional chronic findings as above.        Electronically Signed: Lowell Browne DO    12/29/2024 4:16 PM EST    Workstation ID: [id number]            ASSESSMENT:  -Abdominal pain -resolved  -Diarrhea -improving  -Nausea/vomiting  -Rectal bleeding  -Elevated alkaline phosphatase  -Leukocytosis  -Abnormal CT showing diffuse colonic wall thickening and prominent fluid-filled loops of small bowel  -COPD  -History of cholecystectomy    PLAN:  Patient is a 64-year-old female with history of COPD and cholecystectomy who presented on 12/29 with complaints of abdominal pain, diarrhea, and nausea/vomiting.  Reports that diarrhea, nausea/vomiting, and abdominal pain have significantly improved overnight.    CT abd/pelvis W on admission shows diffuse colonic wall thickening and prominent fluid-filled loops of small bowel in the lower abdomen consistent with infectious versus inflammatory colitis.  WBC 12.75, platelets 499, CRP 3.01, procalcitonin normal.  GI PCR and stool for C. difficile negative this admission.  Continue antibiotics for a total of 10 to 14 days.  Dicyclomine and/or antidiarrheals as needed.  Isolated alk phos elevation noted.  Will fractionate and check GGT.  Advance to low residue diet.  If patient tolerates diet, okay for discharge from GI standpoint.  Our office will arrange outpatient colonoscopy following discharge.      I discussed the patients findings and my recommendations with the patient.  I will discuss the case with Dr. Damon and change the plan accordingly.    We appreciate the referral.    Electronically signed by MINOO Espinoza, 12/30/24, 7:43 AM EST.

## 2024-12-30 NOTE — PLAN OF CARE
Goal Outcome Evaluation:                               VSS on room air. Pt to follow up with GI as outpatient. Repeat blood cultures collected. Pt to dc home with family.

## 2024-12-30 NOTE — OUTREACH NOTE
Prep Survey      Flowsheet Row Responses   [facility] facility patient discharged from? [facility]   Is LACE score < 7 ? Yes   Eligibility [facility]   Date of Admission 12/29/24   Date of Discharge 12/30/24   Discharge Disposition Home or Self Care   Discharge diagnosis Infectious colitis   Does the patient have one of the following disease processes/diagnoses(primary or secondary)? Other   Does the patient have Home health ordered? No   Is there a DME ordered? No   Prep survey completed? Yes            HAILEE RAMIREZ - Registered Nurse

## 2024-12-30 NOTE — PLAN OF CARE
Goal Outcome Evaluation:                      Pt VSS. Pt able to make needs known. Hospitalist notified of blood culture results, will repeat and pt will f/u with results per MD.

## 2024-12-30 NOTE — PLAN OF CARE
Goal Outcome Evaluation:    Pt alert and oriented X 4 .  Pt able to make needs known .  Awaiting GI consult for ABD pain x 4 days .  Plan of care on going at this time .

## 2024-12-30 NOTE — DISCHARGE SUMMARY
[facility]  Discharge Summary    Date of Service: 2024  Patient Name: Elena Dangelo  : 1960  MRN: [id number]    Date of Admission: 2024  Discharge Diagnosis: Infectious colitis  Date of Discharge: 2024  Primary Care Physician: Gabby Barrios APRN      Presenting Problem:   Infectious colitis [A09]  Colitis [K52.9]  Lower GI bleeding [K92.2]  Colitis, infectious [A09]    Active and Resolved Hospital Problems:  Active Hospital Problems    Diagnosis POA    **Infectious colitis [A09] Yes    Colitis [K52.9] Yes      Resolved Hospital Problems   No resolved problems to display.     # Acute abdominal pain along with hematochezia secondary to colitis  -pt got abdo pain NV and significant diarrhea with hematochezia  -Leukocytosis present.  -CTAP with Diffuse colonic wall thickening and prominent fluid-filled loops of small bowel in the lower abdomen. Findings are consistent with infectious or inflammatory colitis.  -IV rocephin and falgyl, IVF, pain med, zofran, CLD  -stool panel pending CRP and calprotectin   -GI consulted     #COPD  -Resp treatment to continue        Hospital Course     HPI:64 y.o. old female patient with PMH of COPD presents to the hospital with complaints of abdo pain diarrhea NV. She has cramping abdo pain with hematochezia.  Patient labs showing leukocytosis of 12.  Lactic acid normal.  UA without urinary tract infection.  CMP is still pending.  CT abdominal pelvis with infectious or inflammatory colitis. IV rocephin and flagyl started.  Stool panel minesh  GI is consulted.CT abd/pelvis W on admission shows diffuse colonic wall thickening and prominent fluid-filled loops of small bowel in the lower abdomen consistent with infectious versus inflammatory colitis.  WBC 12.75, platelets 499, CRP 3.01, procalcitonin normal.  GI PCR and stool for C. difficile negative this admission. GI recommended   Continue antibiotics for a total of 10 to  14 days.  Dicyclomine and/or antidiarrheals as needed.  GI will arrange outpatient colonoscopy following discharge.    Blood culture x1 was pos for staph likely contaminant, we repeated cultures x 2  Condition on dc stable.    DISCHARGE Follow Up Recommendations for labs and diagnostics: follow with pcp in one week    Day of Discharge     Vital Signs:  Temp:  [98.1 °F (36.7 °C)-99.5 °F (37.5 °C)] 98.1 °F (36.7 °C)  Heart Rate:  [] 82  Resp:  [16-20] 20  BP: (111-143)/(57-80) 111/64  Flow (L/min) (Oxygen Therapy):  [1] 1    Physical Exam      Head: Normocephalic and atraumatic.      Nose: Nose normal.   Eyes:      Extraocular Movements: Extraocular movements intact.      Conjunctivae/sclera: Conjunctivae normal.      Pupils: Pupils are equal, round, and reactive to light.   Cardiovascular:      Rate and Rhythm: normal       Pulses: Normal pulses.      Heart sounds: Normal heart sounds.   Pulmonary:      Effort: normal      Breath sounds: normal   Abdominal:      Abdo tenderness   Musculoskeletal:         General: Normal range of motion.      Cervical back: Normal range of motion and neck supple.   Skin:     General: Skin is dry.   Neurological:      General: No focal deficit present.      Mental Status: alert.   Psychiatric:         Mood and Affect: Mood normal.       Pertinent  and/or Most Recent Results     LAB RESULTS:      Lab 12/30/24  0747 12/29/24  1743 12/29/24  1504 12/29/24  1459   WBC 6.19  --   --  12.75*   HEMOGLOBIN 10.2*  --   --  12.5   HEMATOCRIT 33.9*  --   --  41.4   PLATELETS 395  --   --  499*   NEUTROS ABS 4.11  --   --  10.46*   IMMATURE GRANS (ABS) 0.01  --   --  0.04   LYMPHS ABS 1.23  --   --  1.32   MONOS ABS 0.47  --   --  0.65   EOS ABS 0.30  --   --  0.18   MCV 82.1  --   --  82.5   CRP  --  3.01*  --   --    PROCALCITONIN  --  0.05  --   --    LACTATE  --   --  0.7  --          Lab 12/30/24  0747 12/29/24  1743   SODIUM 139 135*   POTASSIUM 3.9 4.0   CHLORIDE 104 98   CO2 26.7  25.3   ANION GAP 8.3 11.7   BUN 8 13   CREATININE 0.50* 0.58   EGFR 104.2 100.6   GLUCOSE 97 114*   CALCIUM 8.3* 8.8   MAGNESIUM 1.9  --    PHOSPHORUS 3.1  --          Lab 12/29/24  1743   TOTAL PROTEIN 6.9   ALBUMIN 3.8   GLOBULIN 3.1   ALT (SGPT) 12   AST (SGOT) 18   BILIRUBIN 0.3   ALK PHOS 129*   LIPASE 19                     Brief Urine Lab Results  (Last result in the past 365 days)        Color   Clarity   Blood   Leuk Est   Nitrite   Protein   CREAT   Urine HCG        12/29/24 1618 Yellow   Clear   Negative   Negative   Negative   Negative                 Microbiology Results (last 10 days)       Procedure Component Value - Date/Time    Respiratory Panel PCR w/COVID-19(SARS-CoV-2) NICK/ANISH/SYLVIA/PAD/COR/MIGUEL In-House, NP Swab in UTM/VTM, 2 HR TAT - Swab, Nasopharynx [[id number]]  (Normal) Collected: 12/29/24 1736    Lab Status: Final result Specimen: Swab from Nasopharynx Updated: 12/29/24 1829     ADENOVIRUS, PCR Not Detected     Coronavirus 229E Not Detected     Coronavirus HKU1 Not Detected     Coronavirus NL63 Not Detected     Coronavirus OC43 Not Detected     COVID19 Not Detected     Human Metapneumovirus Not Detected     Human Rhinovirus/Enterovirus Not Detected     Influenza A PCR Not Detected     Influenza B PCR Not Detected     Parainfluenza Virus 1 Not Detected     Parainfluenza Virus 2 Not Detected     Parainfluenza Virus 3 Not Detected     Parainfluenza Virus 4 Not Detected     RSV, PCR Not Detected     Bordetella pertussis pcr Not Detected     Bordetella parapertussis PCR Not Detected     Chlamydophila pneumoniae PCR Not Detected     Mycoplasma pneumo by PCR Not Detected    Narrative:      In the setting of a positive respiratory panel with a viral infection PLUS a negative procalcitonin without other underlying concern for bacterial infection, consider observing off antibiotics or discontinuation of antibiotics and continue supportive care. If the respiratory panel is positive for atypical bacterial  infection (Bordetella pertussis, Chlamydophila pneumoniae, or Mycoplasma pneumoniae), consider antibiotic de-escalation to target atypical bacterial infection.    Gastrointestinal Panel, PCR - Stool, Per Rectum [[id number]]  (Normal) Collected: 12/29/24 1653    Lab Status: Final result Specimen: Stool from Per Rectum Updated: 12/29/24 1813     Campylobacter Not Detected     Plesiomonas shigelloides Not Detected     Salmonella Not Detected     Vibrio Not Detected     Vibrio cholerae Not Detected     Yersinia enterocolitica Not Detected     Enteroaggregative E. coli (EAEC) Not Detected     Enteropathogenic E. coli (EPEC) Not Detected     Enterotoxigenic E. coli (ETEC) lt/st Not Detected     Shiga-like toxin-producing E. coli (STEC) stx1/stx2 Not Detected     Shigella/Enteroinvasive E. coli (EIEC) Not Detected     Cryptosporidium Not Detected     Cyclospora cayetanensis Not Detected     Entamoeba histolytica Not Detected     Giardia lamblia Not Detected     Adenovirus F40/41 Not Detected     Astrovirus Not Detected     Norovirus GI/GII Not Detected     Rotavirus A Not Detected     Sapovirus (I, II, IV or V) Not Detected    Clostridioides difficile Toxin - Stool, Per Rectum [[id number]]  (Normal) Collected: 12/29/24 1653    Lab Status: Final result Specimen: Stool from Per Rectum Updated: 12/30/24 0713    Narrative:      The following orders were created for panel order Clostridioides difficile Toxin - Stool, Per Rectum.  Procedure                               Abnormality         Status                     ---------                               -----------         ------                     Clostridioides difficile...[[id number]]  Normal              Final result                 Please view results for these tests on the individual orders.    Clostridioides difficile EIA - Stool, Per Rectum [[id number]]  (Normal) Collected: 12/29/24 1653    Lab Status: Final result Specimen: Stool from Per Rectum Updated: 12/30/24 0713      C Diff GDH Ag Negative     C.diff Toxin Ag Negative    Narrative:      The result indicates the absence of toxigenic C.difficile from stool specimen.    Blood Culture - Blood, Hand, Left [[id number]]  (Abnormal) Collected: 12/29/24 1633    Lab Status: Preliminary result Specimen: Blood from Hand, Left Updated: 12/30/24 1002     Blood Culture Abnormal Stain     Gram Stain Aerobic Bottle Gram positive bacilli      Aerobic Bottle Gram positive cocci in clusters      Anaerobic Bottle Gram positive cocci in clusters    Narrative:      Less than seven (7) mL's of blood was collected.  Insufficient quantity may yield false negative results.    Blood Culture ID, PCR - Blood, Hand, Left [[id number]]  (Abnormal) Collected: 12/29/24 1633    Lab Status: Final result Specimen: Blood from Hand, Left Updated: 12/30/24 1002     BCID, PCR Staph spp, not aureus or lugdunensis. Identification by BCID2 PCR.     BOTTLE TYPE Aerobic Bottle            CT Abdomen Pelvis With Contrast    Result Date: 12/29/2024  Impression: Impression: 1.Diffuse colonic wall thickening and prominent fluid-filled loops of small bowel in the lower abdomen. Findings are consistent with infectious or inflammatory colitis. 2.Mild patchy opacities in the right lung base, likely atelectasis or infection. 3.Additional chronic findings as above. Electronically Signed: Lowell Browne DO  12/29/2024 4:16 PM EST  Workstation ID: [id number]    XR Chest 2 View    Result Date: 12/14/2024  Impression: Impression: 1. Mild increased patchy airspace disease at the right lung base which may relate to pneumonia with small right pleural effusion. 2. Advanced emphysema. Electronically Signed: Alfonso Casas MD  12/14/2024 3:01 PM EST  Workstation ID: [id number]                 Labs Pending at Discharge:  Pending Results       Procedure [Order ID] Specimen - Date/Time    Alkaline Phosphatase, Isoenzymes [[id number]] Collected: 12/30/24 1108    Specimen: Blood Updated: 12/30/24 1113     Blood Culture - Blood, Arm, Left [[id number]] Collected: 12/30/24 1143    Specimen: Blood from Arm, Left Updated: 12/30/24 1148    Blood Culture - Blood, Arm, Left [[id number]] Collected: 12/29/24 1459    Specimen: Blood from Arm, Left Updated: 12/29/24 1504    Blood Culture - Blood, Arm, Right [[id number]] Collected: 12/30/24 1143    Specimen: Blood from Arm, Right Updated: 12/30/24 1148    Calprotectin, Fecal - Stool, Per Rectum [[id number]] Collected: 12/29/24 1653    Specimen: Stool from Per Rectum Updated: 12/29/24 1945            Procedures Performed           Consults:   Consults       Date and Time Order Name Status Description    12/29/2024  5:09 PM Inpatient Gastroenterology Consult Completed                    ASSESSMENT:  -Abdominal pain -resolved  -Diarrhea -improving  -Nausea/vomiting  -Rectal bleeding  -Elevated alkaline phosphatase  -Leukocytosis  -Abnormal CT showing diffuse colonic wall thickening and prominent fluid-filled loops of small bowel  -COPD  -History of cholecystectomy     PLAN:  Patient is a 64-year-old female with history of COPD and cholecystectomy who presented on 12/29 with complaints of abdominal pain, diarrhea, and nausea/vomiting.  Reports that diarrhea, nausea/vomiting, and abdominal pain have significantly improved overnight.     CT abd/pelvis W on admission shows diffuse colonic wall thickening and prominent fluid-filled loops of small bowel in the lower abdomen consistent with infectious versus inflammatory colitis.  WBC 12.75, platelets 499, CRP 3.01, procalcitonin normal.  GI PCR and stool for C. difficile negative this admission.  Continue antibiotics for a total of 10 to 14 days.  Dicyclomine and/or antidiarrheals as needed.  Isolated alk phos elevation noted.  Will fractionate and check GGT.  Advance to low residue diet.  If patient tolerates diet, okay for discharge from GI standpoint.  Our office will arrange outpatient colonoscopy following discharge.        I discussed  the patients findings and my recommendations with the patient.  I will discuss the case with Dr. Damon and change the plan accordingly.     We appreciate the referral.     Electronically signed by MINOO Espinoza, 12/30/24, 7:43 AM ES    Discharge Details        Discharge Medications        New Medications        Instructions Start Date   cefdinir 300 MG capsule  Commonly known as: OMNICEF   300 mg, Oral, 2 Times Daily      Cholecalciferol 50 MCG (2000 UT) tablet   50 mcg, Oral, Daily      magnesium (as) gluconate 500 (27 Mg) MG tablet  Commonly known as: MAGONATE   500 mg, Oral, Daily      MethylFolate 400 MCG capsule  Generic drug: Levomefolate Glucosamine   400 mcg, Oral, Daily      metroNIDAZOLE 500 MG tablet  Commonly known as: Flagyl   500 mg, Oral, 3 Times Daily      Nicotrol 10 MG inhaler  Generic drug: nicotine   2 puffs, Inhalation, As Needed, Inhale 2 puffs as needed for smoking cessation      saccharomyces boulardii 250 MG capsule  Commonly known as: Florastor   250 mg, Oral, 2 Times Daily             Continue These Medications        Instructions Start Date   albuterol sulfate  (90 Base) MCG/ACT inhaler  Commonly known as: PROVENTIL HFA;VENTOLIN HFA;PROAIR HFA   1 puff, Inhalation, Every 6 Hours PRN      albuterol sulfate  (90 Base) MCG/ACT inhaler  Commonly known as: PROVENTIL HFA;VENTOLIN HFA;PROAIR HFA   2 puffs, Inhalation, Every 4 Hours PRN      albuterol 0.63 MG/3ML nebulizer solution  Commonly known as: ACCUNEB   INHALE 1 VIAL BY MOUTH VIA NEBULIZER EVERY 4 HOURS AS NEEDED FOR WHEEZING.      EPINEPHrine 0.3 MG/0.3ML solution auto-injector injection  Commonly known as: EPIPEN   Inject 0.3 mL under the skin into the appropriate area as directed 1 Time for 1 dose.      ipratropium-albuterol 0.5-2.5 mg/3 ml nebulizer  Commonly known as: DUO-NEB   3 mL, Nebulization, 4 Times Daily PRN      Trelegy Ellipta 200-62.5-25 MCG/ACT inhaler  Generic drug: Fluticasone-Umeclidin-Vilant   1  puff, Inhalation, Daily               Allergies   Allergen Reactions    Azithromycin Anaphylaxis    Naproxen Anaphylaxis    Penicillin G Swelling    Penicillins Anaphylaxis and Swelling    Codeine Nausea And Vomiting         Discharge Disposition:   Home or Self Care    Diet:  Hospital:  Diet Order   Procedures    Diet: Gastrointestinal; Fiber-Restricted; Fluid Consistency: Thin (IDDSI 0)         Discharge Activity:   Activity Instructions       Gradually Increase Activity Until at Pre-Hospitalization Level                CODE STATUS:  Code Status and Medical Interventions: CPR (Attempt to Resuscitate); Full Support   Ordered at: 12/29/24 5377     Code Status (Patient has no pulse and is not breathing):    CPR (Attempt to Resuscitate)     Medical Interventions (Patient has pulse or is breathing):    Full Support         No future appointments.    Additional Instructions for the Follow-ups that You Need to Schedule       Discharge Follow-up with PCP   As directed       Currently Documented PCP:    Gabby Barrios APRN    PCP Phone Number:    [phone]     Follow Up Details: 1 week        Discharge Follow-up with Specified Provider: GI   As directed      To: GI                Time spent on Discharge including face to face service:  34 minutes    Signature: Electronically signed by Trever Torres MD, 12/30/24, 13:55 EST.  [facility]ist Team

## 2024-12-31 ENCOUNTER — TRANSITIONAL CARE MANAGEMENT TELEPHONE ENCOUNTER (OUTPATIENT)
Dept: CALL CENTER | Facility: HOSPITAL | Age: 64
End: 2024-12-31
Payer: COMMERCIAL

## 2024-12-31 NOTE — OUTREACH NOTE
Call Center TCM Note      Flowsheet Row Responses   [facility] facility patient discharged from? [facility]   Does the patient have one of the following disease processes/diagnoses(primary or secondary)? Other   TCM attempt successful? No   Unsuccessful attempts Attempt 1   Call Status Left message            Agata Franco RN    12/31/2024, 12:38 EST

## 2024-12-31 NOTE — OUTREACH NOTE
Call Center TCM Note      Flowsheet Row Responses   [facility] patient discharged from? [facility]   Does the patient have one of the following disease processes/diagnoses(primary or secondary)? Other   TCM attempt successful? Yes   Call start time 1518   Call end time 1529   Meds reviewed with patient/caregiver? Yes   Is the patient having any side effects they believe may be caused by any medication additions or changes? No   Does the patient have all medications ordered at discharge? Yes   Is the patient taking all medications as directed (includes completed medication regime)? Yes   Comments Patient states is needing PCP hospital f/u appt after 4pm due to her work-no appts available after 4pm. Will route to PCP office for review. Patient states waiting for GI to call with colonoscopy appt.   Does the patient have an appointment with their PCP within 7-14 days of discharge? No   Nursing Interventions Routed TCM call to PCP office, PCP office requested to make appointment - message sent   Has home health visited the patient within 72 hours of discharge? N/A   Psychosocial issues? No   Did the patient receive a copy of their discharge instructions? Yes   Nursing interventions Reviewed instructions with patient   What is the patient's perception of their health status since discharge? Same   Is the patient/caregiver able to teach back signs and symptoms related to disease process for when to call PCP? Yes   Is the patient/caregiver able to teach back signs and symptoms related to disease process for when to call 911? Yes   Is the patient/caregiver able to teach back the hierarchy of who to call/visit for symptoms/problems? PCP, Specialist, Home health nurse, Urgent Care, ED, 911 Yes   If the patient is a current smoker, are they able to teach back resources for cessation? Smoking cessation medications, [phone]  [Has decreased smoking down to one PPD.]   TCM call completed? Yes   Wrap up additional comments  Patient states is feeling about the same. Continues with weakness/fatigue. States having very little diarrhea. Denies any fever or abdominal pain. Denies any needs today. TCM complete.   Call end time 1529   Would this patient benefit from a Referral to [facility]? No   Is the patient interested in additional calls from an ambulatory ? No            Agata Franco RN    12/31/2024, 15:31 EST

## 2025-01-01 LAB
BACTERIA SPEC AEROBE CULT: ABNORMAL
BACTERIA SPEC AEROBE CULT: ABNORMAL
GRAM STN SPEC: ABNORMAL
ISOLATED FROM: ABNORMAL
ISOLATED FROM: ABNORMAL

## 2025-01-02 LAB
ALP BONE CFR SERPL: 39 % (ref 14–68)
ALP INTEST CFR SERPL: 1 % (ref 0–18)
ALP LIVER CFR SERPL: 60 % (ref 18–85)
ALP SERPL-CCNC: 114 IU/L (ref 44–121)

## 2025-01-03 LAB
BACTERIA SPEC AEROBE CULT: NORMAL
CALPROTECTIN STL-MCNT: 122 UG/G (ref 0–120)

## 2025-01-04 LAB
BACTERIA SPEC AEROBE CULT: NORMAL
BACTERIA SPEC AEROBE CULT: NORMAL

## 2025-01-07 NOTE — PROGRESS NOTES
Transitional Care Follow Up Visit  Subjective     Elnea Dangelo is a 64 y.o. female who presents for a transitional care management visit.    Within 48 business hours after discharge our office contacted her via telephone to coordinate her care and needs.      I reviewed and discussed the details of that call along with the discharge summary, hospital problems, inpatient lab results, inpatient diagnostic studies, and consultation reports with Elena.     Current outpatient and discharge medications have been reconciled for the patient.  Reviewed by: MINOO Reeves          12/30/2024     6:20 PM   Date of TCM Phone Call   [facility]   Date of Admission 12/29/2024   Date of Discharge 12/30/2024   Discharge Disposition Home or Self Care     Risk for Readmission (LACE) Score: 6 (12/30/2024  6:00 AM)      History of Present Illness  Elena Dangelo is a 64-year-old female who reports to the office today for hospital follow-up.    Acute colitis-     Course During Hospital Stay: Upon review of record, patient was recently in the hospital from 12/29/2024 to 12/30/2024 due to infectious colitis.  Patient has CT abdomen pelvis which showed diffuse colonic wall thickening and prominent fluid-filled loops of small bowel in the lower abdomen.  Patient received IV Rocephin and Flagyl as well as IV fluids, pain medication, Zofran.  GI was consulted.  Patient was discharged home on nicotine inhalers, Flagyl, methyl folate, magnesium gluconate, colecalciferol, cefdinir, and Florastor.     {Common H&P Review Areas:77750}    Review of Systems    Objective   There were no vitals taken for this visit.  Physical Exam  Vitals reviewed.   Constitutional:       Appearance: Normal appearance.   HENT:      Head: Normocephalic and atraumatic.      Nose: Nose normal.      Mouth/Throat:      Mouth: Mucous membranes are moist.      Pharynx: Oropharynx is clear.   Eyes:      Extraocular Movements: Extraocular movements intact.       Conjunctiva/sclera: Conjunctivae normal.      Pupils: Pupils are equal, round, and reactive to light.   Cardiovascular:      Rate and Rhythm: Normal rate and regular rhythm.      Pulses: Normal pulses.      Heart sounds: Normal heart sounds.      Comments: S1, S2 audible  Pulmonary:      Effort: Pulmonary effort is normal.      Breath sounds: Normal breath sounds.      Comments: On room air   Abdominal:      General: Abdomen is flat. Bowel sounds are normal.      Palpations: Abdomen is soft.   Musculoskeletal:         General: Normal range of motion.      Cervical back: Normal range of motion.   Skin:     General: Skin is warm and dry.   Neurological:      General: No focal deficit present.      Mental Status: She is alert and oriented to person, place, and time. Mental status is at baseline.   Psychiatric:         Mood and Affect: Mood normal.         Behavior: Behavior normal.         Thought Content: Thought content normal.         Judgment: Judgment normal.         Assessment & Plan   {Assess/PlanSmartLinks:16032}

## 2025-01-08 ENCOUNTER — TELEMEDICINE (OUTPATIENT)
Dept: FAMILY MEDICINE CLINIC | Facility: CLINIC | Age: 65
End: 2025-01-08
Payer: COMMERCIAL

## 2025-01-08 DIAGNOSIS — D64.9 ANEMIA, UNSPECIFIED TYPE: ICD-10-CM

## 2025-01-08 DIAGNOSIS — K52.9 COLITIS: Primary | ICD-10-CM

## 2025-01-08 PROCEDURE — 99213 OFFICE O/P EST LOW 20 MIN: CPT | Performed by: NURSE PRACTITIONER

## 2025-01-08 RX ORDER — ALBUTEROL SULFATE 90 UG/1
INHALANT RESPIRATORY (INHALATION)
COMMUNITY

## 2025-01-08 RX ORDER — TIOTROPIUM BROMIDE AND OLODATEROL 3.124; 2.736 UG/1; UG/1
SPRAY, METERED RESPIRATORY (INHALATION) EVERY 24 HOURS
COMMUNITY

## 2025-01-08 NOTE — ASSESSMENT & PLAN NOTE
Acute colitis- She reports in the mornings she is still have diarrhea. She is still on antibiotics. She has not been taking the florastor as she has not had medication to buy it. She does not have a follow-up appointment with gastroenterology.     Encourage to take probiotic since unable to afford florastor   Encourage to reach out to gastroenterology for an appointment   Encourage to complete antibiotic

## 2025-01-08 NOTE — PROGRESS NOTES
Mode of Visit: Video  Location of patient: home  Provider location: [facility] [place] [facility] [place]   You have chosen to receive care through a telehealth visit.  Does the patient consent to use a video/audio connection for your medical care today? Yes  The visit included audio and video interaction. No technical issues occurred during this visit.     Chief Complaint  Diarrhea       Elena Dangelo is a 64 year old female who presents via telehealth today for hospital follow-up.    Acute colitis- She reports in the mornings she is still have diarrhea. She is still on antibiotics. She has not been taking the florastor as she has not had medication to buy it. She does not have a follow-up appointment with gastroenterology.         Subjective          Yayo Test presents to [facility]  History of Present Illness    Review of Systems   Constitutional:  Negative for chills and fever.   HENT:  Negative for congestion.    Respiratory:  Negative for shortness of breath.    Cardiovascular:  Negative for chest pain.   Gastrointestinal:  Positive for diarrhea. Negative for abdominal pain, nausea and vomiting.   Genitourinary:  Negative for difficulty urinating.   Musculoskeletal:  Negative for myalgias.   Skin: Negative.    Neurological:  Negative for dizziness, light-headedness and headache.        Objective   Vital Signs:   There were no vitals taken for this visit.    Virtual Visit Physical Exam  Result Review :                  Physical Exam  Constitutional:       Appearance: Normal appearance.   HENT:      Head: Normocephalic and atraumatic.   Pulmonary:      Effort: Pulmonary effort is normal. No respiratory distress.   Neurological:      Mental Status: She is alert.   Psychiatric:         Mood and Affect: Mood and affect normal.         Speech: Speech normal.         Behavior: Behavior normal. Behavior is cooperative.         Thought Content: Thought content normal.          Judgment: Judgment normal.           Assessment and Plan    Assessment & Plan          Diagnoses and all orders for this visit:    1. Colitis (Primary)  Assessment & Plan:  Acute colitis- She reports in the mornings she is still have diarrhea. She is still on antibiotics. She has not been taking the florastor as she has not had medication to buy it. She does not have a follow-up appointment with gastroenterology.     Encourage to take probiotic since unable to afford florastor   Encourage to reach out to gastroenterology for an appointment   Encourage to complete antibiotic      2. Anemia, unspecified type  Assessment & Plan:  Recent CBC reviewed  Hbg 10.2 and low MCH, MCV    Check iron panel at next visit and CBC            Follow Up   Follow-up for annual physical   Patient was given instructions and counseling regarding his condition or for health maintenance advice. Please see specific information pulled into the AVS if appropriate.

## 2025-03-14 RX ORDER — FLUTICASONE FUROATE, UMECLIDINIUM BROMIDE AND VILANTEROL TRIFENATATE 200; 62.5; 25 UG/1; UG/1; UG/1
1 POWDER RESPIRATORY (INHALATION) DAILY
Qty: 180 EACH | Refills: 0 | Status: SHIPPED | OUTPATIENT
Start: 2025-03-14

## 2025-03-14 NOTE — TELEPHONE ENCOUNTER
Rx Refill Note  Requested Prescriptions     Pending Prescriptions Disp Refills    Trelegy Ellipta 200-62.5-25 MCG/ACT inhaler [Pharmacy Med Name: Trelegy Ellipta Inhalation Aerosol Powder Breath Activated 200-62.5-25 MCG/ACT] 180 each 0     Sig: INHALE 1 PUFF BY MOUTH EVERY DAY      Last office visit with prescribing clinician: 2/23/2024   Last telemedicine visit with prescribing clinician: 1/8/2025   Next office visit with prescribing clinician: Visit date not found                         Would you like a call back once the refill request has been completed: [] Yes [] No    If the office needs to give you a call back, can they leave a voicemail: [] Yes [] No    Shasta Roy, RT  03/14/25, 10:41 EDT

## 2025-04-15 ENCOUNTER — TELEPHONE (OUTPATIENT)
Dept: FAMILY MEDICINE CLINIC | Facility: CLINIC | Age: 65
End: 2025-04-15
Payer: COMMERCIAL

## 2025-04-15 NOTE — TELEPHONE ENCOUNTER
PER PROVIDER REQUEST PATIENT WAS NOTIFIED THAT SHE IS DUE FOR HER CT CHEST W/O CONTRAST. PATIENT STATES SHE KNOWS AND ALREADY HAS THE PHONE NUMBER, SHE WILL CALL TO SCHEDULE.

## 2025-06-21 DIAGNOSIS — J43.8 OTHER EMPHYSEMA: ICD-10-CM

## 2025-06-23 RX ORDER — FLUTICASONE FUROATE, UMECLIDINIUM BROMIDE AND VILANTEROL TRIFENATATE 200; 62.5; 25 UG/1; UG/1; UG/1
1 POWDER RESPIRATORY (INHALATION) DAILY
Qty: 180 EACH | Refills: 0 | Status: SHIPPED | OUTPATIENT
Start: 2025-06-23

## 2025-06-23 RX ORDER — ALBUTEROL SULFATE 0.63 MG/3ML
SOLUTION RESPIRATORY (INHALATION)
Qty: 600 ML | Refills: 0 | Status: SHIPPED | OUTPATIENT
Start: 2025-06-23

## 2025-06-23 NOTE — TELEPHONE ENCOUNTER
Rx Refill Note  Requested Prescriptions     Pending Prescriptions Disp Refills    albuterol (ACCUNEB) 0.63 MG/3ML nebulizer solution [Pharmacy Med Name: Albuterol Sulfate Inhalation Nebulization Solution 0.63 MG/3ML] 600 mL 0     Sig: INHALE 1 VIAL BY MOUTH VIA NEBULIZER EVERY 4 HOURS AS NEEDED FOR WHEEZING.    Trelegy Ellipta 200-62.5-25 MCG/ACT inhaler [Pharmacy Med Name: Trelegy Ellipta Inhalation Aerosol Powder Breath Activated 200-62.5-25 MCG/ACT] 180 each 0     Sig: INHALE 1 PUFF BY MOUTH EVERY DAY      Last office visit with prescribing clinician: 2/23/2024   Last telemedicine visit with prescribing clinician: 1/8/2025   Next office visit with prescribing clinician: Visit date not found                         Would you like a call back once the refill request has been completed: [] Yes [] No    If the office needs to give you a call back, can they leave a voicemail: [] Yes [] No    Shasta Roy, RT  06/23/25, 12:20 EDT

## 2025-06-25 RX ORDER — ALBUTEROL SULFATE 90 UG/1
2 INHALANT RESPIRATORY (INHALATION) EVERY 4 HOURS PRN
Qty: 6.7 G | Refills: 0 | Status: SHIPPED | OUTPATIENT
Start: 2025-06-25